# Patient Record
Sex: MALE | Race: WHITE | NOT HISPANIC OR LATINO | URBAN - METROPOLITAN AREA
[De-identification: names, ages, dates, MRNs, and addresses within clinical notes are randomized per-mention and may not be internally consistent; named-entity substitution may affect disease eponyms.]

---

## 2023-09-09 PROBLEM — E78.5 HYPERLIPIDEMIA: Status: ACTIVE | Noted: 2023-09-09

## 2023-09-09 PROBLEM — R73.01 IMPAIRED FASTING GLUCOSE: Status: ACTIVE | Noted: 2023-09-09

## 2023-09-09 PROBLEM — I10 ESSENTIAL HYPERTENSION: Status: ACTIVE | Noted: 2023-09-09

## 2023-09-09 PROBLEM — L40.9 PSORIASIS: Status: ACTIVE | Noted: 2023-09-09

## 2023-09-09 RX ORDER — CLOBETASOL PROPIONATE 0.5 MG/G
CREAM TOPICAL
COMMUNITY
Start: 2014-02-18

## 2023-09-09 RX ORDER — POTASSIUM CHLORIDE 1500 MG/1
TABLET, EXTENDED RELEASE ORAL
COMMUNITY
Start: 2022-10-18

## 2023-09-09 RX ORDER — NAPROXEN SODIUM 220 MG/1
TABLET, FILM COATED ORAL
COMMUNITY

## 2023-09-09 RX ORDER — HYDROCHLOROTHIAZIDE 25 MG/1
TABLET ORAL
COMMUNITY

## 2023-09-09 RX ORDER — MULTIVITAMIN
TABLET ORAL
COMMUNITY

## 2023-09-09 RX ORDER — ATORVASTATIN CALCIUM 20 MG/1
TABLET, FILM COATED ORAL
COMMUNITY
Start: 2023-03-13

## 2023-09-09 RX ORDER — LISINOPRIL 40 MG/1
TABLET ORAL
COMMUNITY

## 2023-10-01 ENCOUNTER — PHARMACY VISIT (OUTPATIENT)
Dept: PHARMACY | Facility: CLINIC | Age: 58
End: 2023-10-01
Payer: COMMERCIAL

## 2023-10-01 PROCEDURE — RXMED WILLOW AMBULATORY MEDICATION CHARGE

## 2024-01-03 ENCOUNTER — PHARMACY VISIT (OUTPATIENT)
Dept: PHARMACY | Facility: CLINIC | Age: 59
End: 2024-01-03
Payer: COMMERCIAL

## 2024-01-03 PROCEDURE — RXMED WILLOW AMBULATORY MEDICATION CHARGE

## 2024-03-04 DIAGNOSIS — I10 ESSENTIAL HYPERTENSION: Primary | ICD-10-CM

## 2024-03-05 RX ORDER — HYDROCHLOROTHIAZIDE 25 MG/1
25 TABLET ORAL DAILY
Qty: 90 TABLET | Refills: 3 | Status: SHIPPED | OUTPATIENT
Start: 2024-03-05 | End: 2025-03-05

## 2024-03-05 RX ORDER — POTASSIUM CHLORIDE 20 MEQ/1
20 TABLET, EXTENDED RELEASE ORAL DAILY
Qty: 90 TABLET | Refills: 3 | Status: SHIPPED | OUTPATIENT
Start: 2024-03-05 | End: 2025-03-05

## 2024-03-05 RX ORDER — LISINOPRIL 40 MG/1
40 TABLET ORAL DAILY
Qty: 90 TABLET | Refills: 3 | Status: SHIPPED | OUTPATIENT
Start: 2024-03-05 | End: 2025-03-05

## 2024-03-27 PROCEDURE — RXMED WILLOW AMBULATORY MEDICATION CHARGE

## 2024-03-28 ENCOUNTER — PHARMACY VISIT (OUTPATIENT)
Dept: PHARMACY | Facility: CLINIC | Age: 59
End: 2024-03-28
Payer: COMMERCIAL

## 2024-06-16 ENCOUNTER — APPOINTMENT (OUTPATIENT)
Dept: RADIOLOGY | Facility: HOSPITAL | Age: 59
End: 2024-06-16
Payer: COMMERCIAL

## 2024-06-16 ENCOUNTER — HOSPITAL ENCOUNTER (EMERGENCY)
Facility: HOSPITAL | Age: 59
Discharge: HOME | End: 2024-06-16
Attending: EMERGENCY MEDICINE
Payer: COMMERCIAL

## 2024-06-16 ENCOUNTER — APPOINTMENT (OUTPATIENT)
Dept: CARDIOLOGY | Facility: HOSPITAL | Age: 59
End: 2024-06-16
Payer: COMMERCIAL

## 2024-06-16 VITALS
HEIGHT: 72 IN | RESPIRATION RATE: 18 BRPM | BODY MASS INDEX: 32.25 KG/M2 | DIASTOLIC BLOOD PRESSURE: 67 MMHG | OXYGEN SATURATION: 95 % | SYSTOLIC BLOOD PRESSURE: 107 MMHG | TEMPERATURE: 98.6 F | WEIGHT: 238.1 LBS | HEART RATE: 65 BPM

## 2024-06-16 DIAGNOSIS — E87.1 HYPONATREMIA: ICD-10-CM

## 2024-06-16 DIAGNOSIS — B34.9 VIRAL ILLNESS: ICD-10-CM

## 2024-06-16 DIAGNOSIS — R53.83 FATIGUE, UNSPECIFIED TYPE: Primary | ICD-10-CM

## 2024-06-16 DIAGNOSIS — R31.9 HEMATURIA, UNSPECIFIED TYPE: ICD-10-CM

## 2024-06-16 LAB
ALBUMIN SERPL-MCNC: 3.8 G/DL (ref 3.5–5)
ALP BLD-CCNC: 122 U/L (ref 35–125)
ALT SERPL-CCNC: 57 U/L (ref 5–40)
AMPHETAMINES UR QL SCN>1000 NG/ML: NEGATIVE
ANION GAP SERPL CALC-SCNC: 12 MMOL/L
APAP SERPL-MCNC: <5 UG/ML
APPEARANCE UR: CLEAR
AST SERPL-CCNC: 35 U/L (ref 5–40)
BARBITURATES UR QL SCN>300 NG/ML: NEGATIVE
BASOPHILS # BLD AUTO: 0.03 X10*3/UL (ref 0–0.1)
BASOPHILS NFR BLD AUTO: 0.3 %
BENZODIAZ UR QL SCN>300 NG/ML: NEGATIVE
BILIRUB SERPL-MCNC: 0.8 MG/DL (ref 0.1–1.2)
BILIRUB UR STRIP.AUTO-MCNC: NEGATIVE MG/DL
BUN SERPL-MCNC: 27 MG/DL (ref 8–25)
BZE UR QL SCN>300 NG/ML: NEGATIVE
CALCIUM SERPL-MCNC: 9.1 MG/DL (ref 8.5–10.4)
CANNABINOIDS UR QL SCN>50 NG/ML: NEGATIVE
CHLORIDE SERPL-SCNC: 94 MMOL/L (ref 97–107)
CO2 SERPL-SCNC: 22 MMOL/L (ref 24–31)
COLOR UR: YELLOW
CREAT SERPL-MCNC: 0.9 MG/DL (ref 0.4–1.6)
EGFRCR SERPLBLD CKD-EPI 2021: >90 ML/MIN/1.73M*2
EOSINOPHIL # BLD AUTO: 0 X10*3/UL (ref 0–0.7)
EOSINOPHIL NFR BLD AUTO: 0 %
ERYTHROCYTE [DISTWIDTH] IN BLOOD BY AUTOMATED COUNT: 11.9 % (ref 11.5–14.5)
ETHANOL SERPL-MCNC: <0.01 G/DL
FENTANYL+NORFENTANYL UR QL SCN: NEGATIVE
FLUAV RNA RESP QL NAA+PROBE: NOT DETECTED
FLUBV RNA RESP QL NAA+PROBE: NOT DETECTED
GLUCOSE SERPL-MCNC: 152 MG/DL (ref 65–99)
GLUCOSE UR STRIP.AUTO-MCNC: NORMAL MG/DL
HCT VFR BLD AUTO: 39.1 % (ref 41–52)
HETEROPH AB SERPLBLD QL IA.RAPID: NEGATIVE
HGB BLD-MCNC: 13.5 G/DL (ref 13.5–17.5)
IMM GRANULOCYTES # BLD AUTO: 0.03 X10*3/UL (ref 0–0.7)
IMM GRANULOCYTES NFR BLD AUTO: 0.3 % (ref 0–0.9)
KETONES UR STRIP.AUTO-MCNC: NEGATIVE MG/DL
LEUKOCYTE ESTERASE UR QL STRIP.AUTO: NEGATIVE
LIPASE SERPL-CCNC: 29 U/L (ref 16–63)
LYMPHOCYTES # BLD AUTO: 1.52 X10*3/UL (ref 1.2–4.8)
LYMPHOCYTES NFR BLD AUTO: 16.6 %
MCH RBC QN AUTO: 29.9 PG (ref 26–34)
MCHC RBC AUTO-ENTMCNC: 34.5 G/DL (ref 32–36)
MCV RBC AUTO: 87 FL (ref 80–100)
METHADONE UR QL SCN>300 NG/ML: NEGATIVE
MONOCYTES # BLD AUTO: 0.65 X10*3/UL (ref 0.1–1)
MONOCYTES NFR BLD AUTO: 7.1 %
NEUTROPHILS # BLD AUTO: 6.9 X10*3/UL (ref 1.2–7.7)
NEUTROPHILS NFR BLD AUTO: 75.7 %
NITRITE UR QL STRIP.AUTO: NEGATIVE
NRBC BLD-RTO: 0 /100 WBCS (ref 0–0)
OPIATES UR QL SCN>300 NG/ML: NEGATIVE
OXYCODONE UR QL: NEGATIVE
PCP UR QL SCN>25 NG/ML: NEGATIVE
PH UR STRIP.AUTO: 6 [PH]
PLATELET # BLD AUTO: 191 X10*3/UL (ref 150–450)
POTASSIUM SERPL-SCNC: 3.9 MMOL/L (ref 3.4–5.1)
PROT SERPL-MCNC: 7.1 G/DL (ref 5.9–7.9)
PROT UR STRIP.AUTO-MCNC: ABNORMAL MG/DL
RBC # BLD AUTO: 4.52 X10*6/UL (ref 4.5–5.9)
RBC # UR STRIP.AUTO: ABNORMAL /UL
RBC #/AREA URNS AUTO: NORMAL /HPF
SALICYLATES SERPL-MCNC: <0 MG/DL
SARS-COV-2 RNA RESP QL NAA+PROBE: NOT DETECTED
SODIUM SERPL-SCNC: 128 MMOL/L (ref 133–145)
SP GR UR STRIP.AUTO: 1.03
TROPONIN T SERPL-MCNC: 17 NG/L
TROPONIN T SERPL-MCNC: 18 NG/L
TSH SERPL DL<=0.05 MIU/L-ACNC: 0.84 MIU/L (ref 0.27–4.2)
UROBILINOGEN UR STRIP.AUTO-MCNC: ABNORMAL MG/DL
WBC # BLD AUTO: 9.1 X10*3/UL (ref 4.4–11.3)
WBC #/AREA URNS AUTO: NORMAL /HPF

## 2024-06-16 PROCEDURE — 85025 COMPLETE CBC W/AUTO DIFF WBC: CPT | Performed by: CLINICAL NURSE SPECIALIST

## 2024-06-16 PROCEDURE — 36415 COLL VENOUS BLD VENIPUNCTURE: CPT | Performed by: CLINICAL NURSE SPECIALIST

## 2024-06-16 PROCEDURE — 2500000004 HC RX 250 GENERAL PHARMACY W/ HCPCS (ALT 636 FOR OP/ED): Performed by: CLINICAL NURSE SPECIALIST

## 2024-06-16 PROCEDURE — 84484 ASSAY OF TROPONIN QUANT: CPT | Performed by: CLINICAL NURSE SPECIALIST

## 2024-06-16 PROCEDURE — 71046 X-RAY EXAM CHEST 2 VIEWS: CPT | Performed by: RADIOLOGY

## 2024-06-16 PROCEDURE — 99283 EMERGENCY DEPT VISIT LOW MDM: CPT | Mod: 25

## 2024-06-16 PROCEDURE — 80053 COMPREHEN METABOLIC PANEL: CPT | Performed by: CLINICAL NURSE SPECIALIST

## 2024-06-16 PROCEDURE — 96361 HYDRATE IV INFUSION ADD-ON: CPT

## 2024-06-16 PROCEDURE — 87636 SARSCOV2 & INF A&B AMP PRB: CPT | Performed by: CLINICAL NURSE SPECIALIST

## 2024-06-16 PROCEDURE — 81003 URINALYSIS AUTO W/O SCOPE: CPT | Performed by: CLINICAL NURSE SPECIALIST

## 2024-06-16 PROCEDURE — 80143 DRUG ASSAY ACETAMINOPHEN: CPT | Performed by: EMERGENCY MEDICINE

## 2024-06-16 PROCEDURE — 84443 ASSAY THYROID STIM HORMONE: CPT | Performed by: CLINICAL NURSE SPECIALIST

## 2024-06-16 PROCEDURE — 96360 HYDRATION IV INFUSION INIT: CPT

## 2024-06-16 PROCEDURE — 80307 DRUG TEST PRSMV CHEM ANLYZR: CPT | Performed by: EMERGENCY MEDICINE

## 2024-06-16 PROCEDURE — 86308 HETEROPHILE ANTIBODY SCREEN: CPT | Performed by: EMERGENCY MEDICINE

## 2024-06-16 PROCEDURE — 71046 X-RAY EXAM CHEST 2 VIEWS: CPT

## 2024-06-16 PROCEDURE — 93005 ELECTROCARDIOGRAM TRACING: CPT

## 2024-06-16 PROCEDURE — 83690 ASSAY OF LIPASE: CPT | Performed by: CLINICAL NURSE SPECIALIST

## 2024-06-16 ASSESSMENT — COLUMBIA-SUICIDE SEVERITY RATING SCALE - C-SSRS
6. HAVE YOU EVER DONE ANYTHING, STARTED TO DO ANYTHING, OR PREPARED TO DO ANYTHING TO END YOUR LIFE?: NO
1. IN THE PAST MONTH, HAVE YOU WISHED YOU WERE DEAD OR WISHED YOU COULD GO TO SLEEP AND NOT WAKE UP?: NO
2. HAVE YOU ACTUALLY HAD ANY THOUGHTS OF KILLING YOURSELF?: NO

## 2024-06-16 ASSESSMENT — PAIN SCALES - GENERAL: PAINLEVEL_OUTOF10: 0 - NO PAIN

## 2024-06-16 ASSESSMENT — PAIN - FUNCTIONAL ASSESSMENT: PAIN_FUNCTIONAL_ASSESSMENT: 0-10

## 2024-06-16 ASSESSMENT — PAIN DESCRIPTION - PROGRESSION: CLINICAL_PROGRESSION: NOT CHANGED

## 2024-06-16 NOTE — ED PROVIDER NOTES
Department of Emergency Medicine   ED  Provider Note  Admit Date/RoomTime: 6/16/2024  2:24 PM  ED Room: ST28/ST        History of Present Illness:  Chief Complaint   Patient presents with    Flu Symptoms     Fever fatigue loss of appetite since thursday         Eric Sanchez is a 59 y.o. male history of hypertension presenting to the ED for generalized fatigue weakness fever max temp of 102 since Thursday.  Denies cough congestion runny nose sore throat no abdominal pain nausea vomiting or diarrhea no chest pain or shortness of breath no neck pain and no headache.  No one else is sick at home.  He denies pressure burning or frequency of urination.,  Presents today because he has extreme fatigue decreased appetite weakness and on and off fevers.    Review of Systems:   Pertinent positives and negatives are stated within HPI, all other systems reviewed and are negative.        --------------------------------------------- PAST HISTORY ---------------------------------------------  Past Medical History:  has no past medical history on file.  Past Surgical History:  has no past surgical history on file.  Social History:    Family History: family history includes Breast cancer in his mother; Diabetes (age of onset: 40 - 49) in his father; Heart disease (age of onset: 50 - 59) in his father.. Unless otherwise noted, family history is non contributory  The patient’s home medications have been reviewed.  Allergies: Cephalexin        ---------------------------------------------------PHYSICAL EXAM--------------------------------------    GENERAL APPEARANCE: Awake and alert.   VITAL SIGNS: As per the nurses' triage record.   HEENT: Normocephalic, atraumatic. Extraocular muscles are intact. Pupils equal round and reactive to light. Conjunctiva are pink. Negative scleral icterus. Mucous membranes are moist. Tongue in the midline. Pharynx was without erythema or exudates, uvula midline  NECK: Soft Nontender and supple,  full gross ROM, no meningeal signs.  CHEST: Nontender to palpation. Clear to auscultation bilaterally. No rales, rhonchi, or wheezing.   HEART: S1, S2. Regular rate and rhythm. No murmurs, gallops or rubs.  Strong and equal pulses in the extremities.   ABDOMEN: Soft, nontender, nondistended, positive bowel sounds, no palpable masses.  Back: No pain palpation of the cervical thoracic or lumbar spine no step-offs crepitus or bruising no CVA tenderness no saddle paresthesias    MUSCULCSKELETAL: The calves are nontender to palpation. Full gross active range of motion. Ambulating on own with no acute difficulties  NEUROLOGICAL: Awake, alert and oriented x 3. Power intact in the upper and lower extremities. Sensation is intact to light touch in the upper and lower extremities.   IMMUNOLOGICAL: No lymphatic streaking noted   DERM: No petechiae, rashes, or ecchymoses.          ------------------------- NURSING NOTES AND VITALS REVIEWED ---------------------------  The nursing notes within the ED encounter and vital signs as below have been reviewed by myself  /67 (BP Location: Right arm, Patient Position: Sitting)   Pulse 91   Temp 37 °C (98.6 °F) (Oral)   Resp 18   Ht 1.829 m (6')   Wt 108 kg (238 lb 1.6 oz)   SpO2 95%   BMI 32.29 kg/m²     Oxygen Saturation Interpretation: 95% room air      The patient’s available past medical records and past encounters were reviewed.          -----------------------DIAGNOSTIC RESULTS------------------------  LABS:    Labs Reviewed   CBC WITH AUTO DIFFERENTIAL - Abnormal       Result Value    WBC 9.1      nRBC 0.0      RBC 4.52      Hemoglobin 13.5      Hematocrit 39.1 (*)     MCV 87      MCH 29.9      MCHC 34.5      RDW 11.9      Platelets 191      Neutrophils % 75.7      Immature Granulocytes %, Automated 0.3      Lymphocytes % 16.6      Monocytes % 7.1      Eosinophils % 0.0      Basophils % 0.3      Neutrophils Absolute 6.90      Immature Granulocytes Absolute, Automated  0.03      Lymphocytes Absolute 1.52      Monocytes Absolute 0.65      Eosinophils Absolute 0.00      Basophils Absolute 0.03     COMPREHENSIVE METABOLIC PANEL - Abnormal    Glucose 152 (*)     Sodium 128 (*)     Potassium 3.9      Chloride 94 (*)     Bicarbonate 22 (*)     Urea Nitrogen 27 (*)     Creatinine 0.90      eGFR >90      Calcium 9.1      Albumin 3.8      Alkaline Phosphatase 122      Total Protein 7.1      AST 35      Bilirubin, Total 0.8      ALT 57 (*)     Anion Gap 12     URINALYSIS WITH REFLEX CULTURE AND MICROSCOPIC - Abnormal    Color, Urine Yellow      Appearance, Urine Clear      Specific Gravity, Urine 1.030      pH, Urine 6.0      Protein, Urine 20 (TRACE)      Glucose, Urine Normal      Blood, Urine 0.03 (TRACE) (*)     Ketones, Urine NEGATIVE      Bilirubin, Urine NEGATIVE      Urobilinogen, Urine 3 (1+) (*)     Nitrite, Urine NEGATIVE      Leukocyte Esterase, Urine NEGATIVE     SERIAL TROPONIN, INITIAL ([facility]) - Abnormal    Troponin T, High Sensitivity 18 (*)    SERIAL TROPONIN,  2 HOUR ([facility]) - Abnormal    Troponin T, High Sensitivity 17 (*)    TSH WITH REFLEX TO FREE T4 IF ABNORMAL - Normal    Thyroid Stimulating Hormone 0.84      Narrative:     TSH testing is performed using different testing methodology at [facility] than at other [facility]. Direct result comparisons should only be made within the same method.     SARS-COV-2 PCR - Normal    Coronavirus 2019, PCR Not Detected      Narrative:     This assay has received FDA Emergency Use Authorization (EUA) and is only authorized for the duration of time that circumstances exist to justify the authorization of the emergency use of in vitro diagnostic tests for the detection of SARS-CoV-2 virus and/or diagnosis of COVID-19 infection under section 564(b)(1) of the Act, 21 U.S.C. 360bbb-3(b)(1). This assay is an in vitro diagnostic nucleic acid amplification test for the qualitative detection of SARS-CoV-2 from  nasopharyngeal specimens and has been validated for use at [facility]. Negative results do not preclude COVID-19 infections and should not be used as the sole basis for diagnosis, treatment, or other management decisions.     INFLUENZA A AND B PCR - Normal    Flu A Result Not Detected      Flu B Result Not Detected      Narrative:     This assay is an in vitro diagnostic multiplex nucleic acid amplification test for the detection and discrimination of Influenza A & B from nasopharyngeal specimens, and has been validated for use at [facility]. Negative results do not preclude Influenza A/B infections, and should not be used as the sole basis for diagnosis, treatment, or other management decisions. If Influenza A/B and RSV PCR results are negative, testing for Parainfluenza virus, Adenovirus and Metapneumovirus is routinely performed for [facility] pediatric oncology and intensive care inpatients, and is available on other patients by placing an add-on request.   LIPASE - Normal    Lipase 29     DRUG SCREEN,URINE - Normal    Amphetamine Screen, Urine Negative      Barbiturate Screen, Urine Negative      Benzodiazepines Screen, Urine Negative      Cannabinoid Screen, Urine Negative      Cocaine Metabolite Screen, Urine Negative      Fentanyl Screen, Urine Negative      Methadone Screen, Urine Negative      Opiate Screen, Urine Negative      Oxycodone Screen, Urine Negative      PCP Screen, Urine Negative      Narrative:     These toxicological screening tests provide unconfirmed qualitative measurements to aid in treatment and diagnosis in cases of drug use or overdose. This test is used only for medical purposes. A positive result does not indicate or measure intoxication. For specific test performance or pathologist consultation, please contact the Laboratory.    The following threshold concentrations are used for these analyses.Values at or above the threshold concentration  are reported as positive. Values below the threshold are reported as negative.    Drug /Screening Threshold                                                                                                 THC/CANNABINOIDS................50 ng/ml  METHADONE......................300 ng/ml  COCAINE METABOLITES............300 ng/ml  BENZODIAZEPINE.................300 ng/ml  PCP.............................25 ng/ml  OPIATE.........................300 ng/ml  AMPHETAMINE/ECSTASY...........1000 ng/ml  BARBITURATE....................200 ng/ml  OXYCODONE......................100 ng/ml  FENTANYL.........................5 ng/ml       ACUTE TOXICOLOGY PANEL, BLOOD - Normal    Acetaminophen <5.0      Salicylate  <0      Alcohol <0.010     MONONUCLEOSIS SCREEN (HETEROPHILE ANTIBODY) - Normal    Mononucleosis Screen Negative     URINALYSIS MICROSCOPIC WITH REFLEX CULTURE - Normal    WBC, Urine NONE      RBC, Urine NONE     TROPONIN T SERIES, HIGH SENSITIVITY (0, 2 HR, 6 HR)    Narrative:     The following orders were created for panel order Troponin T Series, High Sensitivity (0, 2HR, 6HR).  Procedure                               Abnormality         Status                     ---------                               -----------         ------                     Serial Troponin, Initial...[[id number]]  Abnormal            Final result               Serial Troponin, 2 Hour ...[[id number]]  Abnormal            Final result               Serial Troponin, 6 Hour ...[[id number]]                                                   Please view results for these tests on the individual orders.   URINALYSIS WITH REFLEX CULTURE AND MICROSCOPIC    Narrative:     The following orders were created for panel order Urinalysis with Reflex Culture and Microscopic.  Procedure                               Abnormality         Status                     ---------                               -----------         ------                     Urinalysis with  Reflex C...[[id number]]  Abnormal            Final result               Extra Urine Gray Tube[[id number]]                                                         Please view results for these tests on the individual orders.   EXTRA URINE GRAY TUBE   SERIAL TROPONIN, 6 HOUR ([facility])       As interpreted by me, the above displayed labs are abnormal. All other labs obtained during this visit were within normal range or not returned as of this dictation.      EKG Interpretation    EKG with normal sinus rhythm at 89 bpm, incomplete right bundle branch block pattern, normal axis, normal ST segment, normal T waves         XR chest 2 views   Final Result   1.  No evidence of acute cardiopulmonary process.                  MACRO:   None        Signed by: Ja Chavez 6/16/2024 2:43 PM   Dictation workstation:   [id number]              XR chest 2 views   Final Result   1.  No evidence of acute cardiopulmonary process.                  MACRO:   None        Signed by: Ja Chavez 6/16/2024 2:43 PM   Dictation workstation:   [id number]              ------------------------------ ED COURSE/MEDICAL DECISION MAKING----------------------  Medical Decision Making:   Exam: A medically appropriate exam performed, outlined above, given the known history and presentation.    History obtained from: Review of medical record nursing notes patient      Social Determinants of Health considered during this visit: Takes care of himself at home no out of country travel no sick contacts      PAST MEDICAL HISTORY/Chronic Conditions Affecting Care     has no past medical history on file.       CC/HPI Summary, Social Determinants of health, Records Reviewed, DDx, testing done/not done, ED Course, Reassessment, disposition considerations/shared decision making with patient, consults, disposition:   Presents emergency department complaints of generalized fatigue decreased appetite fever  Plan  Chest x-ray 1. No evidence of acute cardiopulmonary  process.   EKG  CBC  CMP  COVID  Flu  Lipase  Troponin  TSH  Urine  Medical Decision Making/Differential Diagnosis:  Differentials include but not limited to viral illness versus electrolyte abnormality versus dehydration versus thyroid issue versus pneumonia versus acute coronary syndrome  Patient presented with generalized fatigue.  History of fever.  And decreased appetite    Patient did show some mild hyponatremia chloride also slightly low.  With electrolyte imbalance noted.  BUN mildly elevated.  Did receive IV fluids he reports improvement of symptoms after IV fluids may be some component of dehydration.  Will have him follow-up with primary care physician for reevaluation and repeat laboratory data.  Review drug screen negative.  Tox screen negative.  Patient reports sometimes he does drink alcohol on a regular basis but has not been drinking alcohol lately.  Urine is not consistent with UTI did show trace blood no signs of UTI.  Does not present like a kidney stone or pyelonephritis.  Advised to follow-up with primary care physician for resolution of blood.  No protein noted in the urine.  COVID flu negative.  Chest x-ray showed no acute cardiopulmonary process.  Lipase normal.  Denies any fall or injury.  No body aches or muscle aches.  No elevation white blood cell count patient is not anemic mono negative thyroid level within normal limits based on patient's clinical presentation history and symptoms consistent with viral illness with reported fever.  No meningeal signs he is well-nourished well-hydrated nontoxic-appearing no acute distress.  EKG per attending note no ST elevation or arrhythmia troponin 18 and 17 with no complaints of chest pain.  Advised on supportive cares measures at home close follow-up return with any worsening symptoms or concerns   Impression  Viral illness  Fatigue  Hematuria  Mild hyponatremia  Patient seen evaluated with attending physician Dr. Stern   PROCEDURES  Unless  otherwise noted below, none      CONSULTS:   None      ED Course as of 06/16/24 1923   Sun Jun 16, 2024 1901 On reevaluation patient reports he is feeling better.  Reviewed laboratory data and test results with him.  Advised him to follow-up with primary care physician.  Return with any worsening symptoms or concerns [TB]      ED Course User Index  [TB] EUGENE Michel-CNP         Diagnoses as of 06/16/24 1923   Fatigue, unspecified type   Viral illness   Hematuria, unspecified type   Hyponatremia         This patient has remained hemodynamically stable during their ED course.      Critical Care: None      Counseling:  The emergency provider has spoken with the patient and discussed today’s results, in addition to providing specific details for the plan of care and counseling regarding the diagnosis and prognosis.  Questions are answered at this time and they are agreeable with the plan.         --------------------------------- IMPRESSION AND DISPOSITION ---------------------------------    IMPRESSION  1. Fatigue, unspecified type    2. Viral illness    3. Hematuria, unspecified type    4. Hyponatremia        DISPOSITION  Disposition: Discharge home  Patient condition is stable improved        NOTE: This report was transcribed using voice recognition software. Every effort was made to ensure accuracy; however, inadvertent computerized transcription errors may be present      VERA Michel  06/16/24 1924       VERA Michel  06/16/24 1931

## 2024-06-16 NOTE — DISCHARGE INSTRUCTIONS
Increase fluids  Monitor for worsening signs and symptoms of infection or respiratory distress  Follow-up with your primary care physician in 2 days for reevaluation  Return with any worsening symptoms or concerns Tylenol Motrin for pain or fever  Follow-up with your primary care physician for reevaluation of sodium level and resolution of blood in the urine.

## 2024-06-16 NOTE — PROGRESS NOTES
Attestation/Supervisory note for FOX Mateo      The patient is a 59-year-old male presenting to the emergency department for evaluation of generalized malaise and fatigue, subjective fever, and decreased appetite.  He states he has had symptoms for the past 4 to 5 days.  He states his wife was ill but did not have the exact same symptoms.  He denies any headache or visual changes.  He denies any focal weakness or numbness.  He denies any chest pain or shortness of breath.  No abdominal pain.  No nausea or vomiting.  No diarrhea or constipation.  No urinary complaints.  He denies any travel.  No cough or congestion.  No sore throat.  He states he just feels like he does not have any energy.  All pertinent positives and negatives are recorded above.  All other systems reviewed and otherwise negative.  Vital signs within normal limits.  Physical exam with a well-nourished well-developed male in no acute distress.  HEENT exam with dry mucous membranes but otherwise unremarkable.  He has no evidence of airway compromise or respiratory distress.  Abdominal exam is benign.  He has no gross motor, neurologic or vascular deficits on exam.      EKG with normal sinus rhythm at 89 bpm, incomplete right bundle branch block pattern, normal axis, normal ST segment, normal T waves      Diagnostic labs with borderline troponin T results, urobilinogen, electrolyte imbalance with mild hyponatremia, mildly elevated ALT but otherwise unremarkable.  Monospot and TSH were normal as well.      Initial Troponin T 18.  Repeat troponin T 17      XR chest 2 views   Final Result   1.  No evidence of acute cardiopulmonary process.                  MACRO:   None        Signed by: Ja Chavez 6/16/2024 2:43 PM   Dictation workstation:   [id number]           Patient does not have any evidence of ischemia on EKG.  No events on telemetry.  The cardiac enzymes are borderline but flat.  He does not have any evidence of airway compromise or  respiratory distress on exam.  He does not have any acute process on chest x-ray.  No evidence of pneumonia or pneumothorax.  No evidence of CHF.  No widening of the mediastinum.  He does have equal pulses bilaterally.  He does not have any gross motor, neurologic or vascular deficits on exam.  He does have some subtle lab abnormalities but no significant abnormalities.  He does not have any evidence of hemodynamic instability.   Unclear etiology of the patient's symptoms.  It is certainly possible that the patient does have a viral syndrome but we will recommend close outpatient follow-up with his primary care physician for further management of his symptoms.      The patient was released in good condition.  He was instructed to follow-up with his primary care physician within 1 to 2 days for further management of his current symptoms.  He will return to the emergency department sooner with worsening of symptoms or onset of new symptoms.  He will return to the emergency department sooner with worsening of symptoms or onset of new symptoms      Impression/diagnosis:  Malaise and fatigue  Electrolyte imbalance      I personally saw the patient and made/approve the management plan and take responsibility for the patient management.      I independently interpreted the following study (S) EKG and diagnostic labs      I personally discussed the patient's management with the      I reviewed the results of the diagnostic labs and diagnostic imaging.  Formal radiology read was completed by the radiologist.      Aditi Stern MD

## 2024-06-16 NOTE — Clinical Note
Eric Sanchez was seen and treated in our emergency department on 6/16/2024.  He may return to work on 06/19/2024.  1-3 days should be 48 hours without a fever      If you have any questions or concerns, please don't hesitate to call.      Aditi Stern MD

## 2024-06-20 ENCOUNTER — OFFICE VISIT (OUTPATIENT)
Dept: GASTROENTEROLOGY | Facility: CLINIC | Age: 59
End: 2024-06-20
Payer: COMMERCIAL

## 2024-06-20 ENCOUNTER — TELEPHONE (OUTPATIENT)
Dept: PRIMARY CARE | Facility: CLINIC | Age: 59
End: 2024-06-20
Payer: COMMERCIAL

## 2024-06-20 ENCOUNTER — LAB (OUTPATIENT)
Dept: LAB | Facility: LAB | Age: 59
End: 2024-06-20
Payer: COMMERCIAL

## 2024-06-20 VITALS
HEART RATE: 87 BPM | WEIGHT: 234 LBS | BODY MASS INDEX: 31.69 KG/M2 | DIASTOLIC BLOOD PRESSURE: 65 MMHG | HEIGHT: 72 IN | SYSTOLIC BLOOD PRESSURE: 103 MMHG | TEMPERATURE: 96 F

## 2024-06-20 DIAGNOSIS — R50.9 FEVER, UNSPECIFIED FEVER CAUSE: Primary | ICD-10-CM

## 2024-06-20 DIAGNOSIS — R19.7 DIARRHEA, UNSPECIFIED TYPE: ICD-10-CM

## 2024-06-20 DIAGNOSIS — R53.83 OTHER FATIGUE: ICD-10-CM

## 2024-06-20 DIAGNOSIS — Z12.11 SCREENING FOR COLON CANCER: ICD-10-CM

## 2024-06-20 DIAGNOSIS — K76.0 HEPATIC STEATOSIS: ICD-10-CM

## 2024-06-20 DIAGNOSIS — E87.1 HYPONATREMIA: ICD-10-CM

## 2024-06-20 LAB
GLIADIN PEPTIDE IGA SER IA-ACNC: 1.2 U/ML
TTG IGA SER IA-ACNC: <1 U/ML

## 2024-06-20 PROCEDURE — 82784 ASSAY IGA/IGD/IGG/IGM EACH: CPT

## 2024-06-20 PROCEDURE — 3074F SYST BP LT 130 MM HG: CPT | Performed by: NURSE PRACTITIONER

## 2024-06-20 PROCEDURE — RXMED WILLOW AMBULATORY MEDICATION CHARGE

## 2024-06-20 PROCEDURE — 36415 COLL VENOUS BLD VENIPUNCTURE: CPT

## 2024-06-20 PROCEDURE — 83516 IMMUNOASSAY NONANTIBODY: CPT

## 2024-06-20 PROCEDURE — 80053 COMPREHEN METABOLIC PANEL: CPT

## 2024-06-20 PROCEDURE — 99214 OFFICE O/P EST MOD 30 MIN: CPT | Performed by: NURSE PRACTITIONER

## 2024-06-20 PROCEDURE — 99204 OFFICE O/P NEW MOD 45 MIN: CPT | Performed by: NURSE PRACTITIONER

## 2024-06-20 PROCEDURE — 3078F DIAST BP <80 MM HG: CPT | Performed by: NURSE PRACTITIONER

## 2024-06-20 RX ORDER — SODIUM, POTASSIUM,MAG SULFATES 17.5-3.13G
SOLUTION, RECONSTITUTED, ORAL ORAL
Qty: 354 ML | Refills: 0 | Status: SHIPPED | OUTPATIENT
Start: 2024-06-20

## 2024-06-20 ASSESSMENT — ENCOUNTER SYMPTOMS
FATIGUE: 1
PSYCHIATRIC NEGATIVE: 1
ALLERGIC/IMMUNOLOGIC NEGATIVE: 1
RESPIRATORY NEGATIVE: 1
HEMATOLOGIC/LYMPHATIC NEGATIVE: 1
CARDIOVASCULAR NEGATIVE: 1
EYES NEGATIVE: 1
ENDOCRINE NEGATIVE: 1
ABDOMINAL PAIN: 1
MUSCULOSKELETAL NEGATIVE: 1
NEUROLOGICAL NEGATIVE: 1

## 2024-06-20 ASSESSMENT — PAIN SCALES - GENERAL: PAINLEVEL: 2

## 2024-06-20 NOTE — PATIENT INSTRUCTIONS
Fever, generalized weakness and fever- I would recommend labs for c-diff, culture, ova and parasite and blood work for celiac and RSV.     Hepatic steatosis- I will order a US of the liver, your current symptoms are not consistent with this     Hyponatremia- I will repeat labs to check this.    Please follow up with your PCP as well

## 2024-06-20 NOTE — PROGRESS NOTES
Subjective   Patient ID: Eric Sanchez is a 59 y.o. male who presents for New Patient Visit (New patient).  HPI  59-year-old male for new patient visit for abdominal pain and fatigue  Seen in the emergency room 6/16/2024 for generalized fatigue and running a fever up to 102 x 1 week  SHX: cholecystectomy 2008- for stones  Medical history includes hypertension, fatty liver  Labs reviewed toxicology panel negative, drug screen negative  Normal urinalysis  Normal lipase  Mildly elevated troponins  Negative for flu or COVID  TSH 0.84  Testicular was unremarkable  Sodium 128  Normal LFTs except for ALT 57  H&H 13.5 and 39.1  3/14/2019 colonoscopy with Dr. 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   HENT: Negative.     Eyes: Negative.    Respiratory: Negative.     Cardiovascular: Negative.    Gastrointestinal:  Positive for abdominal pain.   Endocrine: Negative.    Genitourinary: Negative.    Musculoskeletal: Negative.    Skin: Negative.    Allergic/Immunologic: Negative.    Neurological: Negative.    Hematological: Negative.    Psychiatric/Behavioral: Negative.         Objective   Physical Exam  Constitutional:       Appearance: Normal appearance.   HENT:      Head: Normocephalic.      Nose: Nose normal.      Mouth/Throat:      Mouth: Mucous membranes are moist.   Eyes:      Pupils: Pupils are equal, round, and reactive to light.   Cardiovascular:      Rate and Rhythm: Normal rate and regular rhythm.      Pulses: Normal pulses.      Heart sounds:  Normal heart sounds.   Pulmonary:      Effort: Pulmonary effort is normal.      Breath sounds: Normal breath sounds.   Abdominal:      General: Bowel sounds are normal.      Palpations: Abdomen is soft.   Musculoskeletal:         General: Normal range of motion.      Cervical back: Normal range of motion and neck supple.   Skin:     General: Skin is warm and dry.   Neurological:      General: No focal deficit present.      Mental Status: He is alert.   Psychiatric:         Mood and Affect: Mood normal.     Rash on truck and back, red- flat, circular    Assessment/Plan       Fever, generalized weakness and fever- I would recommend labs for c-diff, culture, ova and parasite and blood work for celiac and RSV.     Hepatic steatosis- I will order a US of the liver, your current symptoms are not consistent with this     Hyponatremia- I will repeat labs to check this.    Please follow up with your PCP as well    EUGENE Markham-CNP 06/20/24 2:15 PM

## 2024-06-20 NOTE — TELEPHONE ENCOUNTER
Edna states pt has had a fever of 102 + since 6/13.  Pt went to ER on 6/16.  Pt was negative for covid and flu.  Pt  has 102.6 fever, loss of appetite, lethargic, doesn't want to do anything, stiffness in neck, and isn't really drinking since 6/13. I told Edna to take pt to hospital to have him evaluated again.

## 2024-06-20 NOTE — TELEPHONE ENCOUNTER
I called pt back after I saw he was not in the ED.  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  Pt did state that he will go to ED today if needed after seeing the dr there.

## 2024-06-21 DIAGNOSIS — E87.1 HYPONATREMIA: Primary | ICD-10-CM

## 2024-06-21 LAB
ALBUMIN SERPL BCP-MCNC: 3.5 G/DL (ref 3.4–5)
ALP SERPL-CCNC: 87 U/L (ref 33–120)
ALT SERPL W P-5'-P-CCNC: 56 U/L (ref 10–52)
ANION GAP SERPL CALC-SCNC: 19 MMOL/L (ref 10–20)
AST SERPL W P-5'-P-CCNC: 25 U/L (ref 9–39)
BILIRUB SERPL-MCNC: 0.7 MG/DL (ref 0–1.2)
BUN SERPL-MCNC: 17 MG/DL (ref 6–23)
CALCIUM SERPL-MCNC: 9.2 MG/DL (ref 8.6–10.6)
CHLORIDE SERPL-SCNC: 101 MMOL/L (ref 98–107)
CO2 SERPL-SCNC: 22 MMOL/L (ref 21–32)
CREAT SERPL-MCNC: 0.72 MG/DL (ref 0.5–1.3)
EGFRCR SERPLBLD CKD-EPI 2021: >90 ML/MIN/1.73M*2
GLUCOSE SERPL-MCNC: 117 MG/DL (ref 74–99)
POTASSIUM SERPL-SCNC: 4.6 MMOL/L (ref 3.5–5.3)
PROT SERPL-MCNC: 6.3 G/DL (ref 6.4–8.2)
SODIUM SERPL-SCNC: 137 MMOL/L (ref 136–145)

## 2024-06-22 LAB
GLIADIN PEPTIDE IGG SER IA-ACNC: 8.03 FLU (ref 0–4.99)
TTG IGG SER IA-ACNC: <0.82 FLU (ref 0–4.99)

## 2024-06-24 ENCOUNTER — APPOINTMENT (OUTPATIENT)
Dept: RADIOLOGY | Facility: HOSPITAL | Age: 59
End: 2024-06-24
Payer: COMMERCIAL

## 2024-06-24 DIAGNOSIS — K76.0 HEPATIC STEATOSIS: ICD-10-CM

## 2024-06-24 DIAGNOSIS — R53.83 OTHER FATIGUE: Primary | ICD-10-CM

## 2024-06-24 LAB — IGG SERPL-MCNC: 860 MG/DL (ref 700–1600)

## 2024-06-24 PROCEDURE — 76705 ECHO EXAM OF ABDOMEN: CPT

## 2024-06-24 PROCEDURE — 76705 ECHO EXAM OF ABDOMEN: CPT | Performed by: RADIOLOGY

## 2024-06-25 DIAGNOSIS — R89.4 ABNORMAL CELIAC ANTIBODY PANEL: Primary | ICD-10-CM

## 2024-06-25 DIAGNOSIS — K76.0 HEPATIC STEATOSIS: Primary | ICD-10-CM

## 2024-06-25 PROCEDURE — RXMED WILLOW AMBULATORY MEDICATION CHARGE

## 2024-06-27 ENCOUNTER — PHARMACY VISIT (OUTPATIENT)
Dept: PHARMACY | Facility: CLINIC | Age: 59
End: 2024-06-27
Payer: COMMERCIAL

## 2024-06-27 ENCOUNTER — OFFICE VISIT (OUTPATIENT)
Dept: PRIMARY CARE | Facility: CLINIC | Age: 59
End: 2024-06-27
Payer: COMMERCIAL

## 2024-06-27 ENCOUNTER — LAB (OUTPATIENT)
Dept: LAB | Facility: LAB | Age: 59
End: 2024-06-27
Payer: COMMERCIAL

## 2024-06-27 VITALS
BODY MASS INDEX: 31.97 KG/M2 | WEIGHT: 236 LBS | OXYGEN SATURATION: 95 % | HEIGHT: 72 IN | TEMPERATURE: 97.7 F | HEART RATE: 85 BPM | SYSTOLIC BLOOD PRESSURE: 122 MMHG | DIASTOLIC BLOOD PRESSURE: 70 MMHG

## 2024-06-27 DIAGNOSIS — Z01.89 ENCOUNTER FOR ROUTINE LABORATORY TESTING: Primary | ICD-10-CM

## 2024-06-27 DIAGNOSIS — R21 ERYTHEMATOUS RASH: ICD-10-CM

## 2024-06-27 LAB
ALBUMIN SERPL-MCNC: 3.8 G/DL (ref 3.5–5)
ALP BLD-CCNC: 108 U/L (ref 35–125)
ALT SERPL-CCNC: 44 U/L (ref 5–40)
ANION GAP SERPL CALC-SCNC: 10 MMOL/L
AST SERPL-CCNC: 21 U/L (ref 5–40)
BASOPHILS # BLD AUTO: 0.08 X10*3/UL (ref 0–0.1)
BASOPHILS NFR BLD AUTO: 0.7 %
BILIRUB SERPL-MCNC: 0.4 MG/DL (ref 0.1–1.2)
BUN SERPL-MCNC: 16 MG/DL (ref 8–25)
CALCIUM SERPL-MCNC: 9.5 MG/DL (ref 8.5–10.4)
CHLORIDE SERPL-SCNC: 99 MMOL/L (ref 97–107)
CO2 SERPL-SCNC: 26 MMOL/L (ref 24–31)
CREAT SERPL-MCNC: 0.8 MG/DL (ref 0.4–1.6)
EGFRCR SERPLBLD CKD-EPI 2021: >90 ML/MIN/1.73M*2
EOSINOPHIL # BLD AUTO: 0 X10*3/UL (ref 0–0.7)
EOSINOPHIL NFR BLD AUTO: 0 %
ERYTHROCYTE [DISTWIDTH] IN BLOOD BY AUTOMATED COUNT: 12.8 % (ref 11.5–14.5)
GLUCOSE SERPL-MCNC: 117 MG/DL (ref 65–99)
HCT VFR BLD AUTO: 38.7 % (ref 41–52)
HGB BLD-MCNC: 12.6 G/DL (ref 13.5–17.5)
IMM GRANULOCYTES # BLD AUTO: 0.5 X10*3/UL (ref 0–0.7)
IMM GRANULOCYTES NFR BLD AUTO: 4.6 % (ref 0–0.9)
LYMPHOCYTES # BLD AUTO: 3.18 X10*3/UL (ref 1.2–4.8)
LYMPHOCYTES NFR BLD AUTO: 29.1 %
MCH RBC QN AUTO: 30.1 PG (ref 26–34)
MCHC RBC AUTO-ENTMCNC: 32.6 G/DL (ref 32–36)
MCV RBC AUTO: 92 FL (ref 80–100)
MONOCYTES # BLD AUTO: 0.5 X10*3/UL (ref 0.1–1)
MONOCYTES NFR BLD AUTO: 4.6 %
NEUTROPHILS # BLD AUTO: 6.68 X10*3/UL (ref 1.2–7.7)
NEUTROPHILS NFR BLD AUTO: 61 %
NRBC BLD-RTO: 0 /100 WBCS (ref 0–0)
PLATELET # BLD AUTO: 244 X10*3/UL (ref 150–450)
POTASSIUM SERPL-SCNC: 5.1 MMOL/L (ref 3.4–5.1)
PROT SERPL-MCNC: 6.9 G/DL (ref 5.9–7.9)
RBC # BLD AUTO: 4.19 X10*6/UL (ref 4.5–5.9)
SODIUM SERPL-SCNC: 135 MMOL/L (ref 133–145)
WBC # BLD AUTO: 10.9 X10*3/UL (ref 4.4–11.3)

## 2024-06-27 PROCEDURE — 3074F SYST BP LT 130 MM HG: CPT | Performed by: LICENSED PRACTICAL NURSE

## 2024-06-27 PROCEDURE — 87476 LYME DIS DNA AMP PROBE: CPT

## 2024-06-27 PROCEDURE — RXMED WILLOW AMBULATORY MEDICATION CHARGE

## 2024-06-27 PROCEDURE — 85025 COMPLETE CBC W/AUTO DIFF WBC: CPT

## 2024-06-27 PROCEDURE — 80053 COMPREHEN METABOLIC PANEL: CPT

## 2024-06-27 PROCEDURE — 36415 COLL VENOUS BLD VENIPUNCTURE: CPT

## 2024-06-27 PROCEDURE — 3078F DIAST BP <80 MM HG: CPT | Performed by: LICENSED PRACTICAL NURSE

## 2024-06-27 PROCEDURE — 99214 OFFICE O/P EST MOD 30 MIN: CPT | Performed by: LICENSED PRACTICAL NURSE

## 2024-06-27 RX ORDER — TRIAMCINOLONE ACETONIDE 1 MG/G
CREAM TOPICAL
Qty: 454 G | Refills: 2 | OUTPATIENT
Start: 2024-06-27

## 2024-06-27 RX ORDER — DOXYCYCLINE 100 MG/1
TABLET ORAL
Qty: 42 TABLET | Refills: 0 | OUTPATIENT
Start: 2024-06-27

## 2024-06-27 ASSESSMENT — PATIENT HEALTH QUESTIONNAIRE - PHQ9
1. LITTLE INTEREST OR PLEASURE IN DOING THINGS: NOT AT ALL
1. LITTLE INTEREST OR PLEASURE IN DOING THINGS: NOT AT ALL
SUM OF ALL RESPONSES TO PHQ9 QUESTIONS 1 AND 2: 0
2. FEELING DOWN, DEPRESSED OR HOPELESS: NOT AT ALL
SUM OF ALL RESPONSES TO PHQ9 QUESTIONS 1 AND 2: 0
2. FEELING DOWN, DEPRESSED OR HOPELESS: NOT AT ALL

## 2024-06-27 ASSESSMENT — PAIN SCALES - GENERAL: PAINLEVEL: 0-NO PAIN

## 2024-06-27 NOTE — PROGRESS NOTES
[facility]: [facility]  PROGRESS NOTE      Eric Sanchez is a 59 y.o. male that is presenting today for Hospital Follow-up.      Subjective   Pt is a 59yr old male who presents to the office for follow up on his ED visit and for new onset rash. Mr. Sanchez has a PMH of HTN and HLD. He reports 2 weeks ago feeling extreme fatigue and fever. Mr. Sanchez presented to the ED on 6/16/24 with concerns of generalized fatigue, weakness, and  fever max temp of 102. His labs were generally benign. A thorough work up was completed without any significant findings and he was sent home. Mr. Sanchez followed up shortly with GI outpatient related a slightly elevated ALT. U/S to the ABD showed fatty liver only. 3 days after his ED visit Mr. Tafoya  developed a rash to his right abdomen that increased in size over the next day. He notice over the course of the next 3 days several large circular rashes developing over his body.    Today he reports being seen by Dermatology who started him on doxycyline 100mg BID x 21 days for Erythema Migrans. Pt reports his fatigue and fever has generally resolved however the rash persists.       Review of Systems   Skin:  Positive for rash.      Objective   Vitals:    06/27/24 1355   BP: 122/70   Pulse: 85   Temp: 36.5 °C (97.7 °F)   SpO2: 95%      Body mass index is 32 kg/m².  Physical Exam  Vitals reviewed.   Constitutional:       General: He is not in acute distress.     Appearance: Normal appearance. He is not ill-appearing, toxic-appearing or diaphoretic.   Cardiovascular:      Rate and Rhythm: Normal rate and regular rhythm.   Pulmonary:      Effort: Pulmonary effort is normal. No respiratory distress.      Breath sounds: Normal breath sounds. No wheezing or rales.   Skin:     General: Skin is warm and dry.             Comments: Abdominal rash large, diffuse, round and erythemic, multiple over body  Distinct smaller bull's eye rash to the right upper thigh       "      Diagnostic Results   Lab Results   Component Value Date    GLUCOSE 117 (H) 06/20/2024    CALCIUM 9.2 06/20/2024     06/20/2024    K 4.6 06/20/2024    CO2 22 06/20/2024     06/20/2024    BUN 17 06/20/2024    CREATININE 0.72 06/20/2024     Lab Results   Component Value Date    ALT 56 (H) 06/20/2024    AST 25 06/20/2024    ALKPHOS 87 06/20/2024    BILITOT 0.7 06/20/2024     Lab Results   Component Value Date    WBC 9.1 06/16/2024    HGB 13.5 06/16/2024    HCT 39.1 (L) 06/16/2024    MCV 87 06/16/2024     06/16/2024     Lab Results   Component Value Date    CHOL 279 (H) 03/10/2023    CHOL 296 (H) 03/03/2020     Lab Results   Component Value Date    HDL 51 03/10/2023    HDL 42 03/03/2020     Lab Results   Component Value Date    LDLCALC 188 (H) 03/10/2023    LDLCALC 216 (H) 03/03/2020     Lab Results   Component Value Date    TRIG 202 (H) 03/10/2023    TRIG 192 (H) 03/03/2020     No components found for: \"CHOLHDL\"  Lab Results   Component Value Date    HGBA1C 6.1 (H) 03/10/2023     Other labs not included in the list above were reviewed either before or during this encounter.    History    History reviewed. No pertinent past medical history.  History reviewed. No pertinent surgical history.  Family History   Problem Relation Name Age of Onset    Breast cancer Mother      Diabetes Father  40 - 49        late 40s    Heart disease Father  50 - 59        late 50s     Social History     Socioeconomic History    Marital status:      Spouse name: Not on file    Number of children: Not on file    Years of education: Not on file    Highest education level: Not on file   Occupational History    Not on file   Tobacco Use    Smoking status: Unknown    Smokeless tobacco: Not on file   Vaping Use    Vaping status: Never Used   Substance and Sexual Activity    Alcohol use: Not on file    Drug use: Never    Sexual activity: Not on file   Other Topics Concern    Not on file   Social History Narrative    Not " on file     Social Determinants of Health     Financial Resource Strain: Not on file   Food Insecurity: Not on file   Transportation Needs: Not on file   Physical Activity: Not on file   Stress: Not on file   Social Connections: Not on file   Intimate Partner Violence: Not on file   Housing Stability: Not on file     Allergies   Allergen Reactions    Cephalexin Hives     Current Outpatient Medications on File Prior to Visit   Medication Sig Dispense Refill    aspirin 81 mg chewable tablet 1 tablet Orally Once a day      atorvastatin (Lipitor) 20 mg tablet 1 tablet Orally Once a day for 30 day(s)      cholecalciferol, vitamin D3, (VITAMIN D3 ORAL) 2000 UNIT-  as directed Orally Daily with food      clobetasol (Temovate) 0.05 % cream 1 application to affected area Externally Twice a day      docosahexaenoic acid/epa (FISH OIL ORAL) 1000 MG- 3 tablets Orally daily      hydroCHLOROthiazide (HYDRODiuril) 25 mg tablet TAKE 1 TABLET BY MOUTH ONE TIME DAILY 90 tablet 3    Klor-Con M20 20 mEq ER tablet       lisinopril 40 mg tablet TAKE 1 TABLET BY MOUTH ONE TIME DAILY 90 tablet 3    multivitamin tablet 1 tablet Orally daily      potassium chloride CR (Klor-Con M20) 20 mEq ER tablet TAKE 1 TABLET BY MOUTH ONCE A DAY 90 tablet 3    sodium,potassium,mag sulfates (Suprep Bowel Prep Kit) 17.5-3.13-1.6 gram recon soln solution Drink one bottle by mouth twice as directed by the prep instructions 354 mL 0    [DISCONTINUED] lisinopril 40 mg tablet TAKE 1 TABLET BY MOUTH ONE TIME DAILY. for 30       No current facility-administered medications on file prior to visit.     Immunization History   Administered Date(s) Administered    DTaP vaccine, pediatric  (INFANRIX) 10/10/2013    Influenza, injectable, quadrivalent 10/09/2015    Moderna SARS-CoV-2 Vaccination 12/29/2020, 01/27/2021, 11/10/2021    Pfizer COVID-19 vaccine, Fall 2023, 12 years and older, (30mcg/0.3mL) 10/06/2023    Pfizer COVID-19 vaccine, bivalent, age 12 years and  older (30 mcg/0.3 mL) 11/01/2022    Tdap vaccine, age 7 year and older (BOOSTRIX, ADACEL) 03/13/2023     Patient's medical history was reviewed and updated either before or during this encounter.       Assessment/Plan   Problem List Items Addressed This Visit       Erythematous rash    Relevant Orders    Lyme disease, PCR    CBC and Auto Differential    Comprehensive metabolic panel    Referral to Infectious Disease    Encounter for routine laboratory testing - Primary   I agree with likely Erythema Migranes diagnosis based on Pts symptoms and distinct rash. He will continue doxycyline 100mg BID, I shared with him 14 day course is recommended vs 21 day. I will also order CBC, CMP, Lyme test as the rash appeared after his initial blood work. I also will refer him to infectious disease.     Pts PCP, Dr. Rodríguez,  notified of assessment and treatment plan.    EUGENE Maki-CNP

## 2024-06-29 LAB
B BURGDOR DNA SPEC QL NAA+PROBE: NOT DETECTED
SPECIMEN SOURCE: NORMAL

## 2024-07-03 ENCOUNTER — OFFICE VISIT (OUTPATIENT)
Dept: PRIMARY CARE | Facility: CLINIC | Age: 59
End: 2024-07-03
Payer: COMMERCIAL

## 2024-07-03 VITALS
SYSTOLIC BLOOD PRESSURE: 110 MMHG | TEMPERATURE: 97.5 F | HEIGHT: 72 IN | HEART RATE: 88 BPM | WEIGHT: 237 LBS | BODY MASS INDEX: 32.1 KG/M2 | DIASTOLIC BLOOD PRESSURE: 72 MMHG | OXYGEN SATURATION: 96 %

## 2024-07-03 DIAGNOSIS — A69.20 ERYTHEMA MIGRANS (LYME DISEASE): Primary | ICD-10-CM

## 2024-07-03 DIAGNOSIS — R21 ERYTHEMATOUS RASH: Primary | ICD-10-CM

## 2024-07-03 PROBLEM — S40.862A INSECT BITE (NONVENOMOUS) OF LEFT UPPER ARM, INITIAL ENCOUNTER: Status: RESOLVED | Noted: 2024-07-03 | Resolved: 2024-07-03
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
## 2024-07-03 PROCEDURE — 99213 OFFICE O/P EST LOW 20 MIN: CPT | Performed by: LICENSED PRACTICAL NURSE

## 2024-07-03 PROCEDURE — 3074F SYST BP LT 130 MM HG: CPT | Performed by: LICENSED PRACTICAL NURSE

## 2024-07-03 PROCEDURE — 1036F TOBACCO NON-USER: CPT | Performed by: LICENSED PRACTICAL NURSE

## 2024-07-03 PROCEDURE — 3078F DIAST BP <80 MM HG: CPT | Performed by: LICENSED PRACTICAL NURSE

## 2024-07-03 ASSESSMENT — ENCOUNTER SYMPTOMS
LOSS OF SENSATION IN FEET: 0
OCCASIONAL FEELINGS OF UNSTEADINESS: 0
DEPRESSION: 0

## 2024-07-03 ASSESSMENT — LIFESTYLE VARIABLES
HOW OFTEN DURING THE LAST YEAR HAVE YOU FAILED TO DO WHAT WAS NORMALLY EXPECTED FROM YOU BECAUSE OF DRINKING: NEVER
SKIP TO QUESTIONS 9-10: 1
HAVE YOU OR SOMEONE ELSE BEEN INJURED AS A RESULT OF YOUR DRINKING: NO
HAS A RELATIVE, FRIEND, DOCTOR, OR ANOTHER HEALTH PROFESSIONAL EXPRESSED CONCERN ABOUT YOUR DRINKING OR SUGGESTED YOU CUT DOWN: NO
HOW OFTEN DURING THE LAST YEAR HAVE YOU FOUND THAT YOU WERE NOT ABLE TO STOP DRINKING ONCE YOU HAD STARTED: NEVER
AUDIT TOTAL SCORE: 0
HOW OFTEN DURING THE LAST YEAR HAVE YOU NEEDED AN ALCOHOLIC DRINK FIRST THING IN THE MORNING TO GET YOURSELF GOING AFTER A NIGHT OF HEAVY DRINKING: NEVER
AUDIT-C TOTAL SCORE: 0
HOW OFTEN DO YOU HAVE SIX OR MORE DRINKS ON ONE OCCASION: NEVER
HOW OFTEN DO YOU HAVE A DRINK CONTAINING ALCOHOL: NEVER
HOW OFTEN DURING THE LAST YEAR HAVE YOU HAD A FEELING OF GUILT OR REMORSE AFTER DRINKING: NEVER
HOW OFTEN DURING THE LAST YEAR HAVE YOU BEEN UNABLE TO REMEMBER WHAT HAPPENED THE NIGHT BEFORE BECAUSE YOU HAD BEEN DRINKING: NEVER
HOW MANY STANDARD DRINKS CONTAINING ALCOHOL DO YOU HAVE ON A TYPICAL DAY: PATIENT DOES NOT DRINK

## 2024-07-03 ASSESSMENT — PATIENT HEALTH QUESTIONNAIRE - PHQ9
1. LITTLE INTEREST OR PLEASURE IN DOING THINGS: NOT AT ALL
SUM OF ALL RESPONSES TO PHQ9 QUESTIONS 1 AND 2: 0
2. FEELING DOWN, DEPRESSED OR HOPELESS: NOT AT ALL

## 2024-07-03 ASSESSMENT — PAIN SCALES - GENERAL: PAINLEVEL: 0-NO PAIN

## 2024-07-03 NOTE — PROGRESS NOTES
[facility]: [facility]  PROGRESS NOTE      Eric Sanchez is a 59 y.o. male that is presenting today for Follow-up.      Subjective   Pt is a 59yr old male who presents to the office today for follow up on his blood work and rash. Mr. Sanchez has a PMH HTN and HLD. He was seen in the office on 6/27/24 related to an ED follow up and new onset rash. Mr. Sanchez had also been seen earlier in the day by [facility] where he was treated with Doxycyline 100mg BID x 21 weeks for new onset bullseye rash. He had been seen in the ED a few days prior related to increased fatigue fevers without a clear diagnosis. At his OV on on 6/27/24 I ordered a cbc, cmp and lyme disease pcr. I also referred him to ID. Mr. Sanchez lab work was without concern. Lyme disease was not detected.     Today Mr. Sanchez reports that he continues to take the doxycycline as ordered. He shares rash continues to resolve. Mr. Sanchez also reports that his fatigue is also nearly resolved with resumption of his baseline appetite. He will follow up with ID as he has left a msg with Dr. Wasserman's office to schedule his appt.      Rash  The current episode started 1 to 4 weeks ago. The problem has been gradually improving since onset.     Review of Systems   Skin:  Positive for rash.   All other systems reviewed and are negative.     Objective   Vitals:    07/03/24 1529   BP: 110/72   Pulse: 88   Temp: 36.4 °C (97.5 °F)   SpO2: 96%      Body mass index is 32.14 kg/m².  Physical Exam  Vitals reviewed.   Constitutional:       General: He is not in acute distress.     Appearance: Normal appearance. He is not ill-appearing, toxic-appearing or diaphoretic.   Cardiovascular:      Rate and Rhythm: Normal rate and regular rhythm.   Pulmonary:      Effort: Pulmonary effort is normal. No respiratory distress.      Breath sounds: Normal breath sounds. No stridor. No decreased breath sounds, wheezing, rhonchi or rales.   Chest:       "Chest wall: No tenderness.   Skin:     General: Skin is warm and dry.             Comments: Very faint resolving erythemic large circular rashes with and without bullseye appearance       Diagnostic Results   Lab Results   Component Value Date    GLUCOSE 117 (H) 06/27/2024    CALCIUM 9.5 06/27/2024     06/27/2024    K 5.1 06/27/2024    CO2 26 06/27/2024    CL 99 06/27/2024    BUN 16 06/27/2024    CREATININE 0.80 06/27/2024     Lab Results   Component Value Date    ALT 44 (H) 06/27/2024    AST 21 06/27/2024    ALKPHOS 108 06/27/2024    BILITOT 0.4 06/27/2024     Lab Results   Component Value Date    WBC 10.9 06/27/2024    HGB 12.6 (L) 06/27/2024    HCT 38.7 (L) 06/27/2024    MCV 92 06/27/2024     06/27/2024     Lab Results   Component Value Date    CHOL 279 (H) 03/10/2023    CHOL 296 (H) 03/03/2020     Lab Results   Component Value Date    HDL 51 03/10/2023    HDL 42 03/03/2020     Lab Results   Component Value Date    LDLCALC 188 (H) 03/10/2023    LDLCALC 216 (H) 03/03/2020     Lab Results   Component Value Date    TRIG 202 (H) 03/10/2023    TRIG 192 (H) 03/03/2020     No components found for: \"CHOLHDL\"  Lab Results   Component Value Date    HGBA1C 6.1 (H) 03/10/2023     Other labs not included in the list above were reviewed either before or during this encounter.    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     Past Surgical History:   Procedure Laterality Date    CHOLECYSTECTOMY      CIRCUMCISION, PRIMARY       Family History   Problem Relation Name Age of Onset    Breast cancer Mother Marjorie     Diabetes Father Chadwick 40 - 49        late 40s    Heart disease Father Chadwick 50 - 59        late 50s     Social History     Socioeconomic History    Marital status:      Spouse name: Not on file    Number of children: Not on file    Years of education: Not on file    " Highest education level: Not on file   Occupational History    Not on file   Tobacco Use    Smoking status: Never    Smokeless tobacco: Never   Vaping Use    Vaping status: Never Used   Substance and Sexual Activity    Alcohol use: Not Currently    Drug use: Never    Sexual activity: Yes     Partners: Female   Other Topics Concern    Not on file   Social History Narrative    Not on file     Social Determinants of Health     Financial Resource Strain: Not on file   Food Insecurity: Not on file   Transportation Needs: Not on file   Physical Activity: Not on file   Stress: Not on file   Social Connections: Not on file   Intimate Partner Violence: Not on file   Housing Stability: Not on file     Allergies   Allergen Reactions    Cephalexin Hives     Current Outpatient Medications on File Prior to Visit   Medication Sig Dispense Refill    aspirin 81 mg chewable tablet 1 tablet Orally Once a day      cholecalciferol, vitamin D3, (VITAMIN D3 ORAL) 2000 UNIT-  as directed Orally Daily with food      docosahexaenoic acid/epa (FISH OIL ORAL) 1000 MG- 3 tablets Orally daily      doxycycline (Adoxa) 100 mg tablet Take 1 tablet by mouth twice daily for 21 days 42 tablet 0    hydroCHLOROthiazide (HYDRODiuril) 25 mg tablet TAKE 1 TABLET BY MOUTH ONE TIME DAILY 90 tablet 3    lisinopril 40 mg tablet TAKE 1 TABLET BY MOUTH ONE TIME DAILY 90 tablet 3    multivitamin tablet 1 tablet Orally daily      potassium chloride CR (Klor-Con M20) 20 mEq ER tablet TAKE 1 TABLET BY MOUTH ONCE A DAY 90 tablet 3    sodium,potassium,mag sulfates (Suprep Bowel Prep Kit) 17.5-3.13-1.6 gram recon soln solution Drink one bottle by mouth twice as directed by the prep instructions 354 mL 0    triamcinolone (Kenalog) 0.1 % cream Apply to affected area(s) externally twice daily for 2 weeks 454 g 2    [DISCONTINUED] clobetasol (Temovate) 0.05 % cream 1 application to affected area Externally Twice a day      [DISCONTINUED] Klor-Con M20 20 mEq ER tablet        atorvastatin (Lipitor) 20 mg tablet 1 tablet Orally Once a day for 30 day(s)      [DISCONTINUED] lisinopril 40 mg tablet TAKE 1 TABLET BY MOUTH ONE TIME DAILY. for 30       No current facility-administered medications on file prior to visit.     Immunization History   Administered Date(s) Administered    DTaP vaccine, pediatric  (INFANRIX) 10/10/2013    Influenza, injectable, quadrivalent 10/09/2015    Moderna SARS-CoV-2 Vaccination 12/29/2020, 01/27/2021, 11/10/2021    Pfizer COVID-19 vaccine, Fall 2023, 12 years and older, (30mcg/0.3mL) 10/06/2023    Pfizer COVID-19 vaccine, bivalent, age 12 years and older (30 mcg/0.3 mL) 11/01/2022    Tdap vaccine, age 7 year and older (BOOSTRIX, ADACEL) 03/13/2023    Zoster vaccine, recombinant, adult (SHINGRIX) 11/02/2023, 01/04/2024     Patient's medical history was reviewed and updated either before or during this encounter.       Assessment/Plan   Problem List Items Addressed This Visit       Erythematous rash - Primary   Mr. Rodriguez's symtptoms are resolving. I am pleased with these findings. Normal electrolytes, CBC, and no detected lyme disease. Blood work not concerning. He will follow with Infectious Disease.     Mandie Ansari, APRN-CNP

## 2024-07-25 PROCEDURE — 88305 TISSUE EXAM BY PATHOLOGIST: CPT

## 2024-07-25 PROCEDURE — 88341 IMHCHEM/IMCYTCHM EA ADD ANTB: CPT

## 2024-07-25 PROCEDURE — 88342 IMHCHEM/IMCYTCHM 1ST ANTB: CPT

## 2024-07-29 ENCOUNTER — LAB REQUISITION (OUTPATIENT)
Dept: DERMATOPATHOLOGY | Facility: CLINIC | Age: 59
End: 2024-07-29
Payer: COMMERCIAL

## 2024-07-29 DIAGNOSIS — D48.5 NEOPLASM OF UNCERTAIN BEHAVIOR OF SKIN: ICD-10-CM

## 2024-08-01 LAB
LAB AP ASR DISCLAIMER: NORMAL
LABORATORY COMMENT REPORT: NORMAL
PATH REPORT.FINAL DX SPEC: NORMAL
PATH REPORT.GROSS SPEC: NORMAL
PATH REPORT.RELEVANT HX SPEC: NORMAL
PATH REPORT.TOTAL CANCER: NORMAL
PATHOLOGY SYNOPTIC REPORT: NORMAL

## 2024-08-06 ENCOUNTER — OFFICE VISIT (OUTPATIENT)
Dept: GASTROENTEROLOGY | Facility: CLINIC | Age: 59
End: 2024-08-06
Payer: COMMERCIAL

## 2024-08-06 ENCOUNTER — LAB (OUTPATIENT)
Dept: LAB | Facility: LAB | Age: 59
End: 2024-08-06
Payer: COMMERCIAL

## 2024-08-06 ENCOUNTER — PHARMACY VISIT (OUTPATIENT)
Dept: PHARMACY | Facility: CLINIC | Age: 59
End: 2024-08-06
Payer: COMMERCIAL

## 2024-08-06 VITALS
HEART RATE: 71 BPM | HEIGHT: 72 IN | SYSTOLIC BLOOD PRESSURE: 124 MMHG | OXYGEN SATURATION: 97 % | DIASTOLIC BLOOD PRESSURE: 81 MMHG | BODY MASS INDEX: 32.79 KG/M2 | WEIGHT: 242.1 LBS

## 2024-08-06 DIAGNOSIS — K76.0 HEPATIC STEATOSIS: ICD-10-CM

## 2024-08-06 DIAGNOSIS — R74.8 ELEVATED LIVER ENZYMES: Primary | ICD-10-CM

## 2024-08-06 DIAGNOSIS — D64.9 ANEMIA, UNSPECIFIED TYPE: ICD-10-CM

## 2024-08-06 LAB
BASOPHILS # BLD AUTO: 0.06 X10*3/UL (ref 0–0.1)
BASOPHILS NFR BLD AUTO: 0.8 %
EOSINOPHIL # BLD AUTO: 0 X10*3/UL (ref 0–0.7)
EOSINOPHIL NFR BLD AUTO: 0 %
ERYTHROCYTE [DISTWIDTH] IN BLOOD BY AUTOMATED COUNT: 13.3 % (ref 11.5–14.5)
EST. AVERAGE GLUCOSE BLD GHB EST-MCNC: 117 MG/DL
FERRITIN SERPL-MCNC: 165 NG/ML (ref 20–300)
HAV AB SER QL IA: REACTIVE
HBA1C MFR BLD: 5.7 %
HBV SURFACE AB SER-ACNC: <3.1 MIU/ML
HBV SURFACE AG SERPL QL IA: NONREACTIVE
HCT VFR BLD AUTO: 44.4 % (ref 41–52)
HCV AB SER QL: NONREACTIVE
HGB BLD-MCNC: 14.4 G/DL (ref 13.5–17.5)
IMM GRANULOCYTES # BLD AUTO: 0.06 X10*3/UL (ref 0–0.7)
IMM GRANULOCYTES NFR BLD AUTO: 0.8 % (ref 0–0.9)
IRON SATN MFR SERPL: 27 % (ref 25–45)
IRON SERPL-MCNC: 106 UG/DL (ref 35–150)
LYMPHOCYTES # BLD AUTO: 2.3 X10*3/UL (ref 1.2–4.8)
LYMPHOCYTES NFR BLD AUTO: 30.3 %
MCH RBC QN AUTO: 29.8 PG (ref 26–34)
MCHC RBC AUTO-ENTMCNC: 32.4 G/DL (ref 32–36)
MCV RBC AUTO: 92 FL (ref 80–100)
MONOCYTES # BLD AUTO: 0.44 X10*3/UL (ref 0.1–1)
MONOCYTES NFR BLD AUTO: 5.8 %
NEUTROPHILS # BLD AUTO: 4.74 X10*3/UL (ref 1.2–7.7)
NEUTROPHILS NFR BLD AUTO: 62.3 %
NRBC BLD-RTO: 0 /100 WBCS (ref 0–0)
PLATELET # BLD AUTO: 214 X10*3/UL (ref 150–450)
RBC # BLD AUTO: 4.83 X10*6/UL (ref 4.5–5.9)
TIBC SERPL-MCNC: 397 UG/DL (ref 240–445)
UIBC SERPL-MCNC: 291 UG/DL (ref 110–370)
WBC # BLD AUTO: 7.6 X10*3/UL (ref 4.4–11.3)

## 2024-08-06 PROCEDURE — 3074F SYST BP LT 130 MM HG: CPT | Performed by: INTERNAL MEDICINE

## 2024-08-06 PROCEDURE — 83550 IRON BINDING TEST: CPT

## 2024-08-06 PROCEDURE — 3008F BODY MASS INDEX DOCD: CPT | Performed by: INTERNAL MEDICINE

## 2024-08-06 PROCEDURE — 85025 COMPLETE CBC W/AUTO DIFF WBC: CPT

## 2024-08-06 PROCEDURE — 86803 HEPATITIS C AB TEST: CPT

## 2024-08-06 PROCEDURE — 83036 HEMOGLOBIN GLYCOSYLATED A1C: CPT

## 2024-08-06 PROCEDURE — 1036F TOBACCO NON-USER: CPT | Performed by: INTERNAL MEDICINE

## 2024-08-06 PROCEDURE — 87340 HEPATITIS B SURFACE AG IA: CPT

## 2024-08-06 PROCEDURE — 86706 HEP B SURFACE ANTIBODY: CPT

## 2024-08-06 PROCEDURE — 99244 OFF/OP CNSLTJ NEW/EST MOD 40: CPT | Performed by: INTERNAL MEDICINE

## 2024-08-06 PROCEDURE — 86708 HEPATITIS A ANTIBODY: CPT

## 2024-08-06 PROCEDURE — 3079F DIAST BP 80-89 MM HG: CPT | Performed by: INTERNAL MEDICINE

## 2024-08-06 PROCEDURE — 36415 COLL VENOUS BLD VENIPUNCTURE: CPT

## 2024-08-06 PROCEDURE — RXMED WILLOW AMBULATORY MEDICATION CHARGE

## 2024-08-06 PROCEDURE — 83540 ASSAY OF IRON: CPT

## 2024-08-06 PROCEDURE — 82728 ASSAY OF FERRITIN: CPT

## 2024-08-06 ASSESSMENT — ENCOUNTER SYMPTOMS
LOSS OF SENSATION IN FEET: 0
DEPRESSION: 0
OCCASIONAL FEELINGS OF UNSTEADINESS: 0

## 2024-08-06 ASSESSMENT — PAIN SCALES - GENERAL: PAINLEVEL: 0-NO PAIN

## 2024-08-06 NOTE — PROGRESS NOTES
HEPATOLOGY NEW PATIENT VISIT    August 6, 2024    Dr. Eitan Rodríguez       Patient Name:   MARICHUY ALONZO  Medical Record Number: [id number]  YOB: 1965    Dear Dr. Rodríguez,    I had the pleasure of seeing Marichuy Alonzo for consultation in the [facility] ([facility] office). History and physical examination was performed. Pertinent available laboratory, imaging, pathology results were reviewed.     History of Present Illness:   The patient is a 59 year old white male who is referred for evaluation of fatty liver disease.    The patient reports that he was diagnosed with Lyme disease recently in June 2024.  He had fatigue and a rash.  He was noted to have mildly elevated liver enzymes.  Ultrasound showed fatty liver disease.  He was referred to me for further evaluation.  He was eventually treated with doxycycline.    The patient has risk factors for fatty liver disease including diabetes, hyperlipidemia, and being overweight.  He has been prescribed a statin but has not yet taken it.  He has never been on therapy for diabetes.    The patient denied any past history of acute hepatitis or jaundice.      He has never had any manifestations of liver disease including no jaundice, ascites, hepatic encephalopathy, or variceal bleeding. He denied ever having a liver biopsy.    He presented today for evaluation.  He denied any specific liver-related complaints.     Past Medical/Surgical History:   Fatty liver, hyperlipidemia, Lyme disease, diabetes, left ear melanoma in situ.    Family History:   There is no known family history of liver disease.  His maternal grandmother might have had colon cancer.    Social History:   He does drink alcohol.  He reports that his use will vary from none up to about 2 drinks a day.  He denied tattoos.  He denied blood transfusions.  He denied intravenous drug use.  He denied tobacco use.    Review of systems: As noted above.  He reports that  the fatigue and rash have resolved since being treated for the Lyme disease.  He was told that his celiac panel was somewhat abnormal, but he has never had any issues with gluten.  He has intentionally lost about 30 pounds recently with diet and exercise.  No fever, chills, visual changes, auditory changes, shortness of breath, chest pain, abdominal pain, GI bleeding, diarrhea, constipation, depression, dysuria, hematuria, musculoskeletal issues, or rash.       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       Past Surgical History:   Procedure Laterality Date    CHOLECYSTECTOMY      CIRCUMCISION, PRIMARY         Family History   Problem Relation Name Age of Onset    Breast cancer Mother Marjorie     Diabetes Father Chadwick 40 - 49        late 40s    Heart disease Father Chadwick 50 - 59        late 50s       Social History     Socioeconomic History    Marital status:      Spouse name: Not on file    Number of children: Not on file    Years of education: Not on file    Highest education level: Not on file   Occupational History    Not on file   Tobacco Use    Smoking status: Never    Smokeless tobacco: Never   Vaping Use    Vaping status: Never Used   Substance and Sexual Activity    Alcohol use: Not Currently    Drug use: Never    Sexual activity: Yes     Partners: Female   Other Topics Concern    Not on file   Social History Narrative    Not on file     Social Determinants of Health     Financial Resource Strain: Not on file   Food Insecurity: Not on file   Transportation Needs: Not on file   Physical Activity: Not on file   Stress: Not on file   Social Connections: Not on file   Intimate Partner Violence: Not on file   Housing Stability: Not on file       Allergies and Current Medications  Allergies   Allergen Reactions    Cephalexin Hives      Current Outpatient Medications   Medication Sig Dispense Refill    aspirin 81 mg chewable tablet 1 tablet Orally Once a day      atorvastatin (Lipitor) 20 mg tablet 1 tablet Orally Once a day for 30 day(s)      cholecalciferol, vitamin D3, (VITAMIN D3 ORAL) 2000 UNIT-  as directed Orally Daily with food      docosahexaenoic acid/epa (FISH OIL ORAL) 1000 MG- 3 tablets Orally daily      hydroCHLOROthiazide (HYDRODiuril) 25 mg tablet TAKE 1 TABLET BY MOUTH ONE TIME DAILY 90 tablet 3    lisinopril 40 mg tablet TAKE 1 TABLET BY MOUTH ONE TIME DAILY 90 tablet 3    multivitamin tablet 1 tablet Orally daily      potassium chloride CR (Klor-Con M20) 20 mEq ER tablet TAKE 1 TABLET BY MOUTH ONCE A DAY 90 tablet 3    doxycycline (Adoxa) 100 mg tablet Take 1 tablet by mouth twice daily for 21 days 42 tablet 0    sodium,potassium,mag sulfates (Suprep Bowel Prep Kit) 17.5-3.13-1.6 gram recon soln solution Drink one bottle by mouth twice as directed by the prep instructions 354 mL 0    triamcinolone (Kenalog) 0.1 % cream Apply to affected area(s) externally twice daily for 2 weeks (Patient not taking: Reported on 8/6/2024) 454 g 2     No current facility-administered medications for this visit.        Physical Exam  /81 (BP Location: Right arm, Patient Position: Sitting, BP Cuff Size: Large adult)   Pulse 71   Ht 1.829 m (6')   Wt 110 kg (242 lb 1.6 oz)   SpO2 97%   BMI 32.83 kg/m²       Physical Examination:   General Appearance: alert and in no acute distress.   HEENT: oropharynx without lesions. Anicteric sclerae.   Neck supple, nontender, without adenopathy, thyromegaly, or JVD.   Lungs clear to auscultation and percussion.   Heart RRR without murmurs, rubs, or gallops.   Abdomen: Soft, nontender, bowel sounds positive, without obvious ascites. Liver and spleen not palpable.   Extremities full ROM, no atrophy, normal strength. No edema.   Skin no specific lesions.   Neurological exam nonfocal, alert and oriented.   No asterixis.  No spider angiomata, or palmar erythema.     Labs 6/27/2024 glucose 117, sodium 135, creatinine 0.8, protein 6.9, albumin 3.8, alk phos 108, bilirubin 0.4, AST 21, ALT 44, WBC 10.9, hemoglobin 12.6, platelets 244.    Labs 6/20/2024 deamidated gliadin antibody IgG elevated at 8.03, TTG negative, TTG IgA negative, deamidated gliadin antibody IgA normal.    Labs 6/16/2024 urine drug screen negative, alcohol negative, lipase 29, TSH 0.84.    Labs 3/10/2023 cholesterol 279, triglycerides 202, , hemoglobin A1c 6.1%.    Labs 8/3/2020 AFP < 3.    Labs 3/3/2020 hemoglobin A1c 6.7%, cholesterol 296, , triglycerides 192.    Ultrasound 6/24/2024:  IMPRESSION:  Hepatomegaly with hepatic steatosis.      Complex cyst of the left hepatic lobe measuring up to 1.2 cm.    CAT scan with contrast 4/14/2020:  IMPRESSION:  Two lesions within the liver measuring 3 mm and 4 mm respectively likely  representing cysts.    Ultrasound 3/18/2020:  IMPRESSION:  1. Fatty metamorphosis of the liver.  2. Post cholecystectomy with prominence of the common duct. Correlate with  bilirubin and alkaline phosphatase levels.        Assessment/Plan:     1. Fatty liver disease    The patient is referred to me for evaluation of fatty liver disease.    We did speak about fatty liver disease. I explained that the risk factors include diabetes, obesity, hyperlipidemia and alcohol use. I explained that he needs to work on diet, weight loss and exercise.  He also needs to work aggressively with his primary provider about hyperlipidemia and diabetes.  I did explain that 20-25% of patients with WORLEY may proceed to cirrhosis.    I reminded him that working on the same risk factors could decrease his future risk of heart disease and stroke.    He did have an elevated hemoglobin A1c several years ago which did improve.  I will recheck it now, especially with the weight loss.  If it is still elevated, his PCP may need to consider treating  this as well.    He has been told for the last few years that he needs medication for his hyperlipidemia.  He has not yet started it.  I did recommend that he started as prescribed or at least discuss it with his PCP.    I will stage his liver disease with a FibroSCAN test.    With his essentially normal LFTs, I will only do some minimal serologies including hepatitis serologies, iron panel and ferritin.    He can follow-up with [facility] for his GI needs.    Thank you for allowing me to participate in the care of this patient. Please feel free to contact me with any questions regarding their care.     Sincerely,     Mike Burroughs MD, FAASLD, FACG.   Medical Director, Hepatology  Senior Attending Physician  [facility] [place]  [facility]  Professor of Medicine  Division of Gastroenterology and Liver Disease  [facility] [employer]  [address] [place], [place] [address]  Phone: [phone]  Fax: [phone].    Cc: [facility] Lai      This document was generated with a computerized dictation system.  Because of this, there could be errors in grammar and/or content.

## 2024-08-06 NOTE — PATIENT INSTRUCTIONS
Continue to work on diet, weight loss and exercise.    Get lab tests.    Get a FibroScan ultrasound of the liver.

## 2024-08-07 ENCOUNTER — TELEPHONE (OUTPATIENT)
Dept: GASTROENTEROLOGY | Facility: HOSPITAL | Age: 59
End: 2024-08-07
Payer: COMMERCIAL

## 2024-08-07 NOTE — TELEPHONE ENCOUNTER
Spoke to patient discussed lab results including that he is immune to hepatitis A. Questions answered.

## 2024-08-08 PROCEDURE — 88305 TISSUE EXAM BY PATHOLOGIST: CPT

## 2024-08-12 ENCOUNTER — LAB REQUISITION (OUTPATIENT)
Dept: DERMATOPATHOLOGY | Facility: CLINIC | Age: 59
End: 2024-08-12
Payer: COMMERCIAL

## 2024-08-12 DIAGNOSIS — D03.22: ICD-10-CM

## 2024-08-13 LAB
LABORATORY COMMENT REPORT: NORMAL
PATH REPORT.FINAL DX SPEC: NORMAL
PATH REPORT.GROSS SPEC: NORMAL
PATH REPORT.MICROSCOPIC SPEC OTHER STN: NORMAL
PATH REPORT.RELEVANT HX SPEC: NORMAL
PATH REPORT.TOTAL CANCER: NORMAL

## 2024-08-26 ENCOUNTER — CLINICAL SUPPORT (OUTPATIENT)
Dept: GASTROENTEROLOGY | Facility: CLINIC | Age: 59
End: 2024-08-26
Payer: COMMERCIAL

## 2024-08-26 DIAGNOSIS — K76.0 HEPATIC STEATOSIS: ICD-10-CM

## 2024-08-26 PROCEDURE — 91200 LIVER ELASTOGRAPHY: CPT | Performed by: INTERNAL MEDICINE

## 2024-08-30 ENCOUNTER — TELEPHONE (OUTPATIENT)
Dept: GASTROENTEROLOGY | Facility: HOSPITAL | Age: 59
End: 2024-08-30
Payer: COMMERCIAL

## 2024-08-30 NOTE — TELEPHONE ENCOUNTER
Called patient to discuss results and plan per Dr Burroughs:  HEPATOLOGY ATTENDING NOTE     I personally reviewed and interpreted the FibroSCAN results.     It revealed a median liver stiffness of 4.5 kPa with an IQR of 6% and a   CAP of 239.     This is consistent with stage 0-1 disease and mild steatosis.     He should work on WORLEY risk factors.  He should look into getting   hepatitis B vaccines.  He can get LFTs in 1 year.  He can see me back in   clinic in 1 year.     UMER Burroughs.

## 2024-09-03 ENCOUNTER — PATIENT MESSAGE (OUTPATIENT)
Dept: PRIMARY CARE | Facility: CLINIC | Age: 59
End: 2024-09-03
Payer: COMMERCIAL

## 2024-09-03 DIAGNOSIS — E78.2 MIXED HYPERLIPIDEMIA: Primary | ICD-10-CM

## 2024-09-22 PROCEDURE — RXMED WILLOW AMBULATORY MEDICATION CHARGE

## 2024-09-24 RX ORDER — SODIUM CHLORIDE, SODIUM LACTATE, POTASSIUM CHLORIDE, CALCIUM CHLORIDE 600; 310; 30; 20 MG/100ML; MG/100ML; MG/100ML; MG/100ML
20 INJECTION, SOLUTION INTRAVENOUS CONTINUOUS
Status: CANCELLED | OUTPATIENT
Start: 2024-09-24

## 2024-09-24 NOTE — H&P
"History Of Present Illness  Eric Sanchez \"Saundra" is a 59 y.o. male presenting with colon polyps and abnormal celiac panel.     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 He has never used smokeless tobacco. He reports that he does not currently use alcohol. He reports that he does not use drugs.    Family History  Family History   Problem Relation Name Age of Onset    Breast cancer Mother Marjorie     Diabetes Father Chadwick 40 - 49        late 40s    Heart disease Father Chadwick 50 - 59        late 50s        Allergies  Allergies   Allergen Reactions    Cephalexin Hives     Review of Systems     Physical Exam  Vitals and nursing note reviewed.   Constitutional:       Appearance: Normal appearance.   Cardiovascular:      Rate and Rhythm: Normal rate and regular rhythm.      Pulses: Normal pulses.      Heart sounds: Normal heart sounds.   Pulmonary:      Effort: Pulmonary effort is normal.      Breath sounds: Normal breath sounds.   Abdominal:      General: Abdomen is flat.      Palpations: Abdomen is soft.   Neurological:      Mental Status: He is alert.          Last Recorded Vitals  There were no vitals taken for this visit.    Assessment/Plan   Colon adenomas and abnormal celiac panel    Proceed with EGD and colon     Shawn Cabrera MD  "

## 2024-09-25 ENCOUNTER — ANESTHESIA (OUTPATIENT)
Dept: GASTROENTEROLOGY | Facility: HOSPITAL | Age: 59
End: 2024-09-25
Payer: COMMERCIAL

## 2024-09-25 ENCOUNTER — HOSPITAL ENCOUNTER (OUTPATIENT)
Dept: GASTROENTEROLOGY | Facility: HOSPITAL | Age: 59
Discharge: HOME | End: 2024-09-25
Payer: COMMERCIAL

## 2024-09-25 ENCOUNTER — ANESTHESIA EVENT (OUTPATIENT)
Dept: GASTROENTEROLOGY | Facility: HOSPITAL | Age: 59
End: 2024-09-25
Payer: COMMERCIAL

## 2024-09-25 VITALS
TEMPERATURE: 97.7 F | WEIGHT: 239.42 LBS | DIASTOLIC BLOOD PRESSURE: 75 MMHG | HEIGHT: 72 IN | HEART RATE: 63 BPM | RESPIRATION RATE: 18 BRPM | SYSTOLIC BLOOD PRESSURE: 111 MMHG | OXYGEN SATURATION: 98 % | BODY MASS INDEX: 32.43 KG/M2

## 2024-09-25 DIAGNOSIS — R89.4 ABNORMAL CELIAC ANTIBODY PANEL: ICD-10-CM

## 2024-09-25 DIAGNOSIS — Z12.11 SCREENING FOR COLON CANCER: ICD-10-CM

## 2024-09-25 PROCEDURE — 2500000004 HC RX 250 GENERAL PHARMACY W/ HCPCS (ALT 636 FOR OP/ED): Performed by: REGISTERED NURSE

## 2024-09-25 PROCEDURE — 7100000010 HC PHASE TWO TIME - EACH INCREMENTAL 1 MINUTE

## 2024-09-25 PROCEDURE — 45385 COLONOSCOPY W/LESION REMOVAL: CPT | Performed by: INTERNAL MEDICINE

## 2024-09-25 PROCEDURE — 7100000009 HC PHASE TWO TIME - INITIAL BASE CHARGE

## 2024-09-25 PROCEDURE — 2720000007 HC OR 272 NO HCPCS

## 2024-09-25 PROCEDURE — 3700000001 HC GENERAL ANESTHESIA TIME - INITIAL BASE CHARGE

## 2024-09-25 PROCEDURE — A45385 PR COLONOSCOPY,REMV LESN,SNARE: Performed by: ANESTHESIOLOGY

## 2024-09-25 PROCEDURE — 43239 EGD BIOPSY SINGLE/MULTIPLE: CPT | Performed by: INTERNAL MEDICINE

## 2024-09-25 PROCEDURE — 3700000002 HC GENERAL ANESTHESIA TIME - EACH INCREMENTAL 1 MINUTE

## 2024-09-25 PROCEDURE — 2500000005 HC RX 250 GENERAL PHARMACY W/O HCPCS: Performed by: REGISTERED NURSE

## 2024-09-25 RX ORDER — PROPOFOL 10 MG/ML
INJECTION, EMULSION INTRAVENOUS AS NEEDED
Status: DISCONTINUED | OUTPATIENT
Start: 2024-09-25 | End: 2024-09-25

## 2024-09-25 RX ORDER — FENTANYL CITRATE 50 UG/ML
INJECTION, SOLUTION INTRAMUSCULAR; INTRAVENOUS AS NEEDED
Status: DISCONTINUED | OUTPATIENT
Start: 2024-09-25 | End: 2024-09-25

## 2024-09-25 RX ORDER — SODIUM CHLORIDE, SODIUM LACTATE, POTASSIUM CHLORIDE, CALCIUM CHLORIDE 600; 310; 30; 20 MG/100ML; MG/100ML; MG/100ML; MG/100ML
INJECTION, SOLUTION INTRAVENOUS CONTINUOUS PRN
Status: DISCONTINUED | OUTPATIENT
Start: 2024-09-25 | End: 2024-09-25

## 2024-09-25 RX ORDER — LIDOCAINE HYDROCHLORIDE 20 MG/ML
INJECTION, SOLUTION INFILTRATION; PERINEURAL AS NEEDED
Status: DISCONTINUED | OUTPATIENT
Start: 2024-09-25 | End: 2024-09-25

## 2024-09-25 SDOH — HEALTH STABILITY: MENTAL HEALTH: CURRENT SMOKER: 0

## 2024-09-25 ASSESSMENT — PAIN SCALES - GENERAL
PAINLEVEL_OUTOF10: 0 - NO PAIN
PAIN_LEVEL: 4
PAINLEVEL_OUTOF10: 0 - NO PAIN

## 2024-09-25 ASSESSMENT — COLUMBIA-SUICIDE SEVERITY RATING SCALE - C-SSRS
2. HAVE YOU ACTUALLY HAD ANY THOUGHTS OF KILLING YOURSELF?: NO
6. HAVE YOU EVER DONE ANYTHING, STARTED TO DO ANYTHING, OR PREPARED TO DO ANYTHING TO END YOUR LIFE?: NO
1. IN THE PAST MONTH, HAVE YOU WISHED YOU WERE DEAD OR WISHED YOU COULD GO TO SLEEP AND NOT WAKE UP?: NO

## 2024-09-25 ASSESSMENT — PAIN - FUNCTIONAL ASSESSMENT
PAIN_FUNCTIONAL_ASSESSMENT: 0-10

## 2024-09-25 NOTE — ANESTHESIA POSTPROCEDURE EVALUATION
"Patient: Eric Sanchez \"Thomas\"    Procedure Summary       Date: 09/25/24 Room / Location: [facility] [place]    Anesthesia Start: 1312 Anesthesia Stop: 1357    Procedures:       COLONOSCOPY      EGD Diagnosis:       Screening for colon cancer      Abnormal celiac antibody panel    Scheduled Providers: Shawn Cabrera MD; Saad Shipman MD; EUGENE Heller-JEFFREY, DNAP Responsible Provider: Saad Shipman MD    Anesthesia Type: MAC ASA Status: 2            Anesthesia Type: MAC    Vitals Value Taken Time   /72 09/25/24 1406   Temp 36.6 °C (97.9 °F) 09/25/24 1351   Pulse 72 09/25/24 1406   Resp 21 09/25/24 1406   SpO2 96 % 09/25/24 1406       Anesthesia Post Evaluation    Patient location during evaluation: PACU  Patient participation: complete - patient participated  Level of consciousness: awake and alert  Pain score: 4  Pain management: adequate  Airway patency: patent  Cardiovascular status: acceptable  Respiratory status: acceptable  Hydration status: acceptable  Postoperative Nausea and Vomiting: none    No notable events documented.    "

## 2024-09-25 NOTE — DISCHARGE INSTRUCTIONS

## 2024-09-25 NOTE — ANESTHESIA PREPROCEDURE EVALUATION
"Patient: Eric Sanchez \"Thomas\"    Procedure Information       Date/Time: 09/25/24 1230    Scheduled providers: Shawn Cabrera MD; Saad Shipman MD; EUGENE Heller-JEFFREY, DNAKENNY    Procedures:       COLONOSCOPY      EGD    Location: [facility]            Relevant Problems   Cardiac   (+) Essential hypertension   (+) Hyperlipidemia      /Renal   (+) Hyponatremia      ID   (+) Viral illness      Skin   (+) Erythematous rash       Clinical information reviewed:   Tobacco  Allergies  Meds  Problems  Med Hx  Surg Hx   Fam Hx  Soc   Hx        NPO Detail:  NPO/Void Status  Carbohydrate Drink Given Prior to Surgery? : N  Date of Last Liquid: 09/25/24  Time of Last Liquid: 0500  Date of Last Solid: 09/23/24  Time of Last Solid: 0000  Last Intake Type: Clear fluids  Time of Last Void: 1100         Physical Exam    Airway  Mallampati: I  TM distance: >3 FB  Neck ROM: full     Cardiovascular - normal exam     Dental - normal exam     Pulmonary - normal exam     Abdominal - normal exam         Anesthesia Plan    History of general anesthesia?: yes  History of complications of general anesthesia?: no    ASA 2     MAC     The patient is not a current smoker.  Patient was not previously instructed to abstain from smoking on day of procedure.  Patient did not smoke on day of procedure.    intravenous induction   Postoperative administration of opioids is intended.  Anesthetic plan and risks discussed with patient.  Use of blood products discussed with patient who.    Plan discussed with CAA.    "

## 2024-09-26 ASSESSMENT — PAIN SCALES - GENERAL: PAINLEVEL_OUTOF10: 0 - NO PAIN

## 2024-09-28 ENCOUNTER — PHARMACY VISIT (OUTPATIENT)
Dept: PHARMACY | Facility: CLINIC | Age: 59
End: 2024-09-28
Payer: COMMERCIAL

## 2024-09-30 ENCOUNTER — PATIENT MESSAGE (OUTPATIENT)
Dept: PRIMARY CARE | Facility: CLINIC | Age: 59
End: 2024-09-30
Payer: COMMERCIAL

## 2024-09-30 DIAGNOSIS — Z12.5 SCREENING FOR PROSTATE CANCER: Primary | ICD-10-CM

## 2024-10-02 ENCOUNTER — LAB (OUTPATIENT)
Dept: LAB | Facility: LAB | Age: 59
End: 2024-10-02
Payer: COMMERCIAL

## 2024-10-02 DIAGNOSIS — E78.2 MIXED HYPERLIPIDEMIA: ICD-10-CM

## 2024-10-02 DIAGNOSIS — Z12.5 SCREENING FOR PROSTATE CANCER: ICD-10-CM

## 2024-10-02 LAB
ALBUMIN SERPL BCP-MCNC: 4.6 G/DL (ref 3.4–5)
ALP SERPL-CCNC: 77 U/L (ref 33–120)
ALT SERPL W P-5'-P-CCNC: 19 U/L (ref 10–52)
ANION GAP SERPL CALCULATED.3IONS-SCNC: 11 MMOL/L (ref 10–20)
AST SERPL W P-5'-P-CCNC: 19 U/L (ref 9–39)
BILIRUB SERPL-MCNC: 0.5 MG/DL (ref 0–1.2)
BUN SERPL-MCNC: 15 MG/DL (ref 6–23)
CALCIUM SERPL-MCNC: 9.7 MG/DL (ref 8.6–10.3)
CHLORIDE SERPL-SCNC: 103 MMOL/L (ref 98–107)
CHOLEST SERPL-MCNC: 163 MG/DL (ref 0–199)
CHOLEST/HDLC SERPL: 4.1 {RATIO}
CO2 SERPL-SCNC: 29 MMOL/L (ref 21–32)
CREAT SERPL-MCNC: 0.87 MG/DL (ref 0.5–1.3)
EGFRCR SERPLBLD CKD-EPI 2021: >90 ML/MIN/1.73M*2
GLUCOSE SERPL-MCNC: 114 MG/DL (ref 74–99)
HDLC SERPL-MCNC: 39.3 MG/DL
LDLC SERPL CALC-MCNC: 89 MG/DL
NON HDL CHOLESTEROL: 124 MG/DL (ref 0–149)
POTASSIUM SERPL-SCNC: 4 MMOL/L (ref 3.5–5.3)
PROT SERPL-MCNC: 7 G/DL (ref 6.4–8.2)
PSA SERPL-MCNC: 0.93 NG/ML
SODIUM SERPL-SCNC: 139 MMOL/L (ref 136–145)
TRIGL SERPL-MCNC: 172 MG/DL (ref 0–149)
VLDL: 34 MG/DL (ref 0–40)

## 2024-10-02 PROCEDURE — 80053 COMPREHEN METABOLIC PANEL: CPT

## 2024-10-02 PROCEDURE — 36415 COLL VENOUS BLD VENIPUNCTURE: CPT

## 2024-10-02 PROCEDURE — 84153 ASSAY OF PSA TOTAL: CPT

## 2024-10-02 PROCEDURE — 80061 LIPID PANEL: CPT

## 2024-10-03 ASSESSMENT — ENCOUNTER SYMPTOMS
APPETITE CHANGE: 0
SHORTNESS OF BREATH: 0
CHILLS: 0
FEVER: 0
NAUSEA: 0
ABDOMINAL PAIN: 0
HEADACHES: 0
VOMITING: 0
COUGH: 0
DIARRHEA: 0

## 2024-10-03 NOTE — PROGRESS NOTES
[facility]: [facility]  PROGRESS NOTE      Eric Snachez is a 59 y.o. male that is presenting today for Follow-up.    Assessment/Plan   Diagnoses and all orders for this visit:  Mixed hyperlipidemia  Essential hypertension  Hyperglycemia  Vitamin D deficiency  Encounter for prostate cancer screening  Annual physical exam  -     CBC and Auto Differential; Future  -     Comprehensive Metabolic Panel; Future  -     Lipid Panel; Future  -     Hemoglobin A1C; Future  -     Vitamin D 25-Hydroxy,Total (for eval of Vitamin D levels); Future  -     TSH with reflex to Free T4 if abnormal; Future  -     Prostate Specific Antigen; Future  Encounter for immunization  -     Flu vaccine, trivalent, preservative free, age 6 months and greater (Fluarix/Fluzone/Flulaval)  Screening for cardiovascular condition  -     CT cardiac scoring wo IV contrast; Future  Other orders  -     Follow Up In Primary Care - Established; Future    Continue the statin as we are getting excellent results and he has no adverse effects.  Blood pressure controlled on lisinopril and hydrochlorothiazide.  Reviewed the results of his hepatology consultation.  Recent EGD and the pathology is pending at this time.  Reviewed the results of coronary artery calcium scoring and we will proceed.    Subjective   HPI  59 y.o. male here for follow up.  Doing well on the statin without adverse effects.  Recent EGD, path pending.  Saw hepatology without any significant concern for underlying liver disease.    Review of Systems   Constitutional:  Negative for appetite change, chills and fever.   Respiratory:  Negative for cough and shortness of breath.    Cardiovascular:  Negative for chest pain.   Gastrointestinal:  Negative for abdominal pain, diarrhea, nausea and vomiting.   Neurological:  Negative for headaches.   All other systems reviewed and are negative.     Objective   Vitals:    10/04/24 1344   BP: 120/82   Pulse: 52   Temp: 35.6 °C  "(96 °F)   SpO2: 97%      Body mass index is 32.01 kg/m².  Physical Exam  Vitals reviewed.   Constitutional:       General: He is not in acute distress.     Appearance: He is not toxic-appearing.   HENT:      Head: Normocephalic and atraumatic.      Mouth/Throat:      Mouth: Mucous membranes are moist.   Eyes:      Pupils: Pupils are equal, round, and reactive to light.   Cardiovascular:      Rate and Rhythm: Normal rate and regular rhythm.      Heart sounds: No murmur heard.  Pulmonary:      Breath sounds: Normal breath sounds. No wheezing, rhonchi or rales.   Abdominal:      General: There is no distension.      Palpations: Abdomen is soft.   Musculoskeletal:      Right lower leg: No edema.      Left lower leg: No edema.   Neurological:      General: No focal deficit present.      Mental Status: He is alert and oriented to person, place, and time.       Diagnostic Results   Lab Results   Component Value Date    GLUCOSE 114 (H) 10/02/2024    CALCIUM 9.7 10/02/2024     10/02/2024    K 4.0 10/02/2024    CO2 29 10/02/2024     10/02/2024    BUN 15 10/02/2024    CREATININE 0.87 10/02/2024     Lab Results   Component Value Date    ALT 19 10/02/2024    AST 19 10/02/2024    ALKPHOS 77 10/02/2024    BILITOT 0.5 10/02/2024     Lab Results   Component Value Date    WBC 7.6 08/06/2024    HGB 14.4 08/06/2024    HCT 44.4 08/06/2024    MCV 92 08/06/2024     08/06/2024     Lab Results   Component Value Date    CHOL 163 10/02/2024    CHOL 279 (H) 03/10/2023    CHOL 296 (H) 03/03/2020     Lab Results   Component Value Date    HDL 39.3 10/02/2024    HDL 51 03/10/2023    HDL 42 03/03/2020     Lab Results   Component Value Date    LDLCALC 89 10/02/2024    LDLCALC 188 (H) 03/10/2023    LDLCALC 216 (H) 03/03/2020     Lab Results   Component Value Date    TRIG 172 (H) 10/02/2024    TRIG 202 (H) 03/10/2023    TRIG 192 (H) 03/03/2020     No components found for: \"CHOLHDL\"  Lab Results   Component Value Date    HGBA1C 5.7 " (H) 08/06/2024     Other labs not included in the list above were reviewed either before or during this encounter.    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    Psoriasis      Past Surgical History:   Procedure Laterality Date    CHOLECYSTECTOMY      CIRCUMCISION, PRIMARY      COLONOSCOPY W/ BIOPSIES AND POLYPECTOMY       Family History   Problem Relation Name Age of Onset    Breast cancer Mother Marjorie     Diabetes Father Chadwick 40 - 49        late 40s    Heart disease Father Chadwick 50 - 59        late 50s     Social History     Socioeconomic History    Marital status:      Spouse name: Not on file    Number of children: Not on file    Years of education: Not on file    Highest education level: Not on file   Occupational History    Not on file   Tobacco Use    Smoking status: Never    Smokeless tobacco: Never   Vaping Use    Vaping status: Never Used   Substance and Sexual Activity    Alcohol use: Not Currently     Alcohol/week: 2.0 standard drinks of alcohol    Drug use: Never    Sexual activity: Yes     Partners: Female   Other Topics Concern    Not on file   Social History Narrative    Not on file     Social Determinants of Health     Financial Resource Strain: Not on file   Food Insecurity: Not on file   Transportation Needs: Not on file   Physical Activity: Not on file   Stress: Not on file   Social Connections: Not on file   Intimate Partner Violence: Not on file   Housing Stability: Not on file     Allergies   Allergen Reactions    Cephalexin Hives     Current Outpatient Medications on File Prior to Visit   Medication Sig Dispense Refill    aspirin 81 mg chewable tablet 1 tablet Orally Once a day      atorvastatin (Lipitor) 20 mg tablet Take 1 tablet (20 mg) by mouth once daily. 90 tablet 3    cholecalciferol, vitamin D3, (VITAMIN  D3 ORAL) 2000 UNIT-  as directed Orally Daily with food      docosahexaenoic acid/epa (FISH OIL ORAL) 1000 MG- 3 tablets Orally daily      hydroCHLOROthiazide (HYDRODiuril) 25 mg tablet TAKE 1 TABLET BY MOUTH ONE TIME DAILY 90 tablet 3    lisinopril 40 mg tablet TAKE 1 TABLET BY MOUTH ONE TIME DAILY 90 tablet 3    multivitamin tablet 1 tablet Orally daily      potassium chloride CR (Klor-Con M20) 20 mEq ER tablet TAKE 1 TABLET BY MOUTH ONCE A DAY 90 tablet 3    sodium,potassium,mag sulfates (Suprep Bowel Prep Kit) 17.5-3.13-1.6 gram recon soln solution Drink one bottle by mouth twice as directed by the prep instructions 354 mL 0     No current facility-administered medications on file prior to visit.     Immunization History   Administered Date(s) Administered    DTaP vaccine, pediatric  (INFANRIX) 10/10/2013    Flu vaccine, trivalent, preservative free, age 6 months and greater (Fluarix/Fluzone/Flulaval) 10/04/2024    Influenza, injectable, quadrivalent 10/09/2015    Moderna SARS-CoV-2 Vaccination 12/29/2020, 01/27/2021, 11/10/2021    Pfizer COVID-19 vaccine, 12 years and older, (30mcg/0.3mL) (Comirnaty) 10/06/2023    Pfizer COVID-19 vaccine, bivalent, age 12 years and older (30 mcg/0.3 mL) 11/01/2022    Tdap vaccine, age 7 year and older (BOOSTRIX, ADACEL) 03/13/2023    Zoster vaccine, recombinant, adult (SHINGRIX) 11/02/2023, 01/04/2024     Patient's medical history was reviewed and updated either before or during this encounter.       Eitan Rodríguez MD

## 2024-10-04 ENCOUNTER — PATIENT MESSAGE (OUTPATIENT)
Dept: PRIMARY CARE | Facility: CLINIC | Age: 59
End: 2024-10-04

## 2024-10-04 ENCOUNTER — OFFICE VISIT (OUTPATIENT)
Dept: PRIMARY CARE | Facility: CLINIC | Age: 59
End: 2024-10-04
Payer: COMMERCIAL

## 2024-10-04 VITALS
OXYGEN SATURATION: 97 % | DIASTOLIC BLOOD PRESSURE: 82 MMHG | SYSTOLIC BLOOD PRESSURE: 120 MMHG | TEMPERATURE: 96 F | BODY MASS INDEX: 31.97 KG/M2 | WEIGHT: 236 LBS | HEART RATE: 52 BPM | HEIGHT: 72 IN

## 2024-10-04 DIAGNOSIS — I10 ESSENTIAL HYPERTENSION: ICD-10-CM

## 2024-10-04 DIAGNOSIS — R73.9 HYPERGLYCEMIA: ICD-10-CM

## 2024-10-04 DIAGNOSIS — E55.9 VITAMIN D DEFICIENCY: ICD-10-CM

## 2024-10-04 DIAGNOSIS — Z12.5 ENCOUNTER FOR PROSTATE CANCER SCREENING: ICD-10-CM

## 2024-10-04 DIAGNOSIS — Z23 ENCOUNTER FOR IMMUNIZATION: ICD-10-CM

## 2024-10-04 DIAGNOSIS — Z13.6 SCREENING FOR CARDIOVASCULAR CONDITION: ICD-10-CM

## 2024-10-04 DIAGNOSIS — Z00.00 ANNUAL PHYSICAL EXAM: ICD-10-CM

## 2024-10-04 DIAGNOSIS — E78.2 MIXED HYPERLIPIDEMIA: Primary | ICD-10-CM

## 2024-10-04 LAB
LAB AP ASR DISCLAIMER: NORMAL
LABORATORY COMMENT REPORT: NORMAL
PATH REPORT.FINAL DX SPEC: NORMAL
PATH REPORT.GROSS SPEC: NORMAL
PATH REPORT.RELEVANT HX SPEC: NORMAL
PATH REPORT.TOTAL CANCER: NORMAL
RESIDENT REVIEW: NORMAL

## 2024-10-04 ASSESSMENT — PATIENT HEALTH QUESTIONNAIRE - PHQ9
2. FEELING DOWN, DEPRESSED OR HOPELESS: NOT AT ALL
SUM OF ALL RESPONSES TO PHQ9 QUESTIONS 1 AND 2: 0
1. LITTLE INTEREST OR PLEASURE IN DOING THINGS: NOT AT ALL

## 2024-10-04 ASSESSMENT — PAIN SCALES - GENERAL: PAINLEVEL: 0-NO PAIN

## 2024-10-07 ENCOUNTER — PHARMACY VISIT (OUTPATIENT)
Dept: PHARMACY | Facility: CLINIC | Age: 59
End: 2024-10-07
Payer: COMMERCIAL

## 2024-10-07 DIAGNOSIS — A04.8 H. PYLORI INFECTION: Primary | ICD-10-CM

## 2024-10-07 PROCEDURE — RXMED WILLOW AMBULATORY MEDICATION CHARGE

## 2024-10-07 RX ORDER — TETRACYCLINE HYDROCHLORIDE 500 MG/1
500 CAPSULE ORAL 4 TIMES DAILY
Qty: 56 CAPSULE | Refills: 0 | Status: SHIPPED | OUTPATIENT
Start: 2024-10-07 | End: 2024-10-21

## 2024-10-07 RX ORDER — METRONIDAZOLE 500 MG/1
500 TABLET ORAL 2 TIMES DAILY
Qty: 28 TABLET | Refills: 0 | Status: SHIPPED | OUTPATIENT
Start: 2024-10-07 | End: 2024-10-21

## 2024-10-07 RX ORDER — BISMUTH SUBSALICYLATE 262 MG/1
524 TABLET ORAL 2 TIMES DAILY
Qty: 60 TABLET | Refills: 0 | Status: SHIPPED | OUTPATIENT
Start: 2024-10-07 | End: 2024-10-22

## 2024-10-07 RX ORDER — OMEPRAZOLE 40 MG/1
40 CAPSULE, DELAYED RELEASE ORAL
Qty: 28 CAPSULE | Refills: 0 | Status: SHIPPED | OUTPATIENT
Start: 2024-10-07 | End: 2024-10-21

## 2024-10-28 PROCEDURE — RXMED WILLOW AMBULATORY MEDICATION CHARGE

## 2024-11-01 ENCOUNTER — PHARMACY VISIT (OUTPATIENT)
Dept: PHARMACY | Facility: CLINIC | Age: 59
End: 2024-11-01
Payer: COMMERCIAL

## 2024-12-24 PROCEDURE — RXMED WILLOW AMBULATORY MEDICATION CHARGE

## 2024-12-28 ENCOUNTER — PHARMACY VISIT (OUTPATIENT)
Dept: PHARMACY | Facility: CLINIC | Age: 59
End: 2024-12-28
Payer: COMMERCIAL

## 2025-01-26 PROCEDURE — RXMED WILLOW AMBULATORY MEDICATION CHARGE

## 2025-02-01 ENCOUNTER — PHARMACY VISIT (OUTPATIENT)
Dept: PHARMACY | Facility: CLINIC | Age: 60
End: 2025-02-01
Payer: COMMERCIAL

## 2025-03-10 ENCOUNTER — HOSPITAL ENCOUNTER (OUTPATIENT)
Dept: RADIOLOGY | Facility: HOSPITAL | Age: 60
Discharge: HOME | End: 2025-03-10
Payer: COMMERCIAL

## 2025-03-10 DIAGNOSIS — Z13.6 SCREENING FOR CARDIOVASCULAR CONDITION: ICD-10-CM

## 2025-03-10 PROCEDURE — 75571 CT HRT W/O DYE W/CA TEST: CPT

## 2025-03-18 ENCOUNTER — PATIENT MESSAGE (OUTPATIENT)
Dept: PRIMARY CARE | Facility: CLINIC | Age: 60
End: 2025-03-18
Payer: COMMERCIAL

## 2025-03-18 DIAGNOSIS — R93.1 ELEVATED CORONARY ARTERY CALCIUM SCORE: Primary | ICD-10-CM

## 2025-03-21 DIAGNOSIS — I10 ESSENTIAL HYPERTENSION: ICD-10-CM

## 2025-03-21 PROCEDURE — RXMED WILLOW AMBULATORY MEDICATION CHARGE

## 2025-03-21 RX ORDER — POTASSIUM CHLORIDE 20 MEQ/1
20 TABLET, EXTENDED RELEASE ORAL DAILY
Qty: 90 TABLET | Refills: 3 | Status: SHIPPED | OUTPATIENT
Start: 2025-03-21 | End: 2026-03-21

## 2025-03-21 RX ORDER — HYDROCHLOROTHIAZIDE 25 MG/1
25 TABLET ORAL DAILY
Qty: 90 TABLET | Refills: 3 | Status: SHIPPED | OUTPATIENT
Start: 2025-03-21 | End: 2026-03-21

## 2025-03-21 RX ORDER — LISINOPRIL 40 MG/1
40 TABLET ORAL DAILY
Qty: 90 TABLET | Refills: 3 | Status: SHIPPED | OUTPATIENT
Start: 2025-03-21 | End: 2026-03-21

## 2025-03-25 ENCOUNTER — OFFICE VISIT (OUTPATIENT)
Dept: CARDIOLOGY | Facility: CLINIC | Age: 60
End: 2025-03-25
Payer: COMMERCIAL

## 2025-03-25 VITALS
WEIGHT: 247.44 LBS | SYSTOLIC BLOOD PRESSURE: 129 MMHG | HEART RATE: 80 BPM | HEIGHT: 72 IN | DIASTOLIC BLOOD PRESSURE: 79 MMHG | BODY MASS INDEX: 33.52 KG/M2 | OXYGEN SATURATION: 96 %

## 2025-03-25 DIAGNOSIS — E78.2 MIXED HYPERLIPIDEMIA: Primary | ICD-10-CM

## 2025-03-25 PROCEDURE — 3074F SYST BP LT 130 MM HG: CPT | Performed by: INTERNAL MEDICINE

## 2025-03-25 PROCEDURE — 1036F TOBACCO NON-USER: CPT | Performed by: INTERNAL MEDICINE

## 2025-03-25 PROCEDURE — 99214 OFFICE O/P EST MOD 30 MIN: CPT | Performed by: INTERNAL MEDICINE

## 2025-03-25 PROCEDURE — 3078F DIAST BP <80 MM HG: CPT | Performed by: INTERNAL MEDICINE

## 2025-03-25 PROCEDURE — 3008F BODY MASS INDEX DOCD: CPT | Performed by: INTERNAL MEDICINE

## 2025-03-25 PROCEDURE — 99244 OFF/OP CNSLTJ NEW/EST MOD 40: CPT | Performed by: INTERNAL MEDICINE

## 2025-03-25 ASSESSMENT — ENCOUNTER SYMPTOMS
OCCASIONAL FEELINGS OF UNSTEADINESS: 0
LOSS OF SENSATION IN FEET: 0
DEPRESSION: 0

## 2025-03-25 ASSESSMENT — COLUMBIA-SUICIDE SEVERITY RATING SCALE - C-SSRS
1. IN THE PAST MONTH, HAVE YOU WISHED YOU WERE DEAD OR WISHED YOU COULD GO TO SLEEP AND NOT WAKE UP?: NO
2. HAVE YOU ACTUALLY HAD ANY THOUGHTS OF KILLING YOURSELF?: NO
6. HAVE YOU EVER DONE ANYTHING, STARTED TO DO ANYTHING, OR PREPARED TO DO ANYTHING TO END YOUR LIFE?: NO

## 2025-03-25 ASSESSMENT — PAIN SCALES - GENERAL: PAINLEVEL_OUTOF10: 0-NO PAIN

## 2025-03-25 NOTE — PROGRESS NOTES
Primary Care Physician: Eitan Rodríguez MD  Date of Visit: 2025  2:20 PM EDT  [place] [facility] [place] [address]     Chief Complaint:   Chief Complaint   Patient presents with    Wound Check    Hyperlipidemia    Hypertension     Abnormal CT Calcium Score     Assessment for cardiac risk, recent labs reviewed   HPI / Summary  Eric Sancehz is a 59 y.o. male presents for new cardiovascular evaluation. No ref. 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  HTN  Lipid    Specialty Problems          Cardiology Problems    Essential hypertension    Hyperlipidemia        Past Medical History:   Diagnosis Date    Colon polyp     Hyperlipidemia     Hypertension     Insect bite (nonvenomous) of left upper arm, initial encounter 2024    Organic oligospermia 2011    Psoriasis           Past Surgical History:   Procedure Laterality Date    CHOLECYSTECTOMY      CIRCUMCISION, PRIMARY      COLONOSCOPY W/ BIOPSIES AND POLYPECTOMY            Social History:   reports that he has never smoked. He has never used smokeless tobacco. He reports that he does not currently use alcohol after a past usage of about 2.0 standard drinks of alcohol per week. He reports that he does not use drugs.      Allergies:  Allergies   Allergen Reactions    Cephalexin Hives       Outpatient Medications:  Current Outpatient Medications   Medication Instructions    aspirin 81 mg chewable tablet 1 tablet Orally Once a day    atorvastatin (LIPITOR) 20 mg, oral, Daily    cholecalciferol, vitamin D3, (VITAMIN D3 ORAL) 2000 UNIT-  as directed Orally Daily with food    docosahexaenoic acid/epa (FISH OIL ORAL) 1000  MG- 3 tablets Orally daily    hydroCHLOROthiazide (HYDRODiuril) 25 mg tablet TAKE 1 TABLET BY MOUTH ONE TIME DAILY    lisinopril 40 mg tablet TAKE 1 TABLET BY MOUTH ONE TIME DAILY    multivitamin tablet 1 tablet Orally daily    omeprazole (PRILOSEC) 40 mg, oral, 2 times daily before meals, Do not crush or chew.    potassium chloride CR (Klor-Con M20) 20 mEq ER tablet TAKE 1 TABLET BY MOUTH ONCE A DAY    sodium,potassium,mag sulfates (Suprep Bowel Prep Kit) 17.5-3.13-1.6 gram recon soln solution Drink one bottle by mouth twice as directed by the prep instructions       ROS     Physical Exam:  Vitals:    03/25/25 1403   Weight: 112 kg (247 lb 7 oz)   Height: 1.829 m (6')     Wt Readings from Last 5 Encounters:   03/25/25 112 kg (247 lb 7 oz)   10/04/24 107 kg (236 lb)   09/25/24 109 kg (239 lb 6.7 oz)   08/06/24 110 kg (242 lb 1.6 oz)   07/03/24 108 kg (237 lb)     Body mass index is 33.56 kg/m².   Physical Exam   Skin was warm and dry  Neck veins were flat.  Normal carotid upstrokes no bruits.  Regular rhythm no gallop no murmur.  Clear lungs without crackles.  Soft abdomen without masses.  No dependent edema with intact pedal pulses  Last Labs:  CMP:  Recent Labs     10/02/24  0708 06/27/24  1444 06/20/24  1501 06/16/24  1421 03/10/23  1140    135 137 128* 139   K 4.0 5.1 4.6 3.9 4.4    99 101 94* 99   CO2 29 26 22 22* 25   ANIONGAP 11 10 19 12 15   BUN 15 16 17 27* 15   CREATININE 0.87 0.80 0.72 0.90 0.8   EGFR >90 >90 >90 >90 104   GLUCOSE 114* 117* 117* 152* 119*     Recent Labs     10/02/24  0708 06/27/24  1444 06/20/24  1501 06/16/24  1421 03/10/23  1140   ALBUMIN 4.6 3.8 3.5 3.8 4.7   ALKPHOS 77 108 87 122 80   ALT 19 44* 56* 57* 25   AST 19 21 25 35 20   BILITOT 0.5 0.4 0.7 0.8 0.4   LIPASE  --   --   --  29  --      CBC:  Recent Labs     08/06/24  1108 06/27/24  1444 06/16/24  1421 03/10/23  1140 08/03/20  0913   WBC 7.6 10.9 9.1 7.0 6.0   HGB 14.4 12.6* 13.5 15.1 14.0   HCT 44.4 38.7* 39.1* 44.6  "40.8*    244 191 231 197   MCV 92 92 87 90.1 92.1     COAG: No results for input(s): \"INR\", \"DDIMERVTE\" in the last 62519 hours.  HEME/ENDO:  Recent Labs     08/06/24  1108 06/16/24  1421 03/10/23  1140 08/03/20  0913 03/03/20  1558   FERRITIN 165  --   --   --   --    IRONSAT 27  --   --   --   --    TSH  --  0.84 1.67 1.88 1.56   HGBA1C 5.7*  --  6.1*  --  6.7*      CARDIAC:   Recent Labs     08/03/20  0913        Recent Labs     10/02/24  0708 03/10/23  1140 03/03/20  1558   CHOL 163 279* 296*   HDL 39.3 51 42   TRIG 172* 202* 192*       Last Cardiology Tests:  ECG:      Echo:  Echo Results:  No results found for this or any previous visit from the past 3650 days.       Cath:      Stress Test:  Stress Results:  No results found for this or any previous visit from the past 365 days.         Cardiac Imaging:  CT cardiac scoring wo IV contrast  Narrative: Interpreted By:  Jaime Huntley,   STUDY:  CT CARDIAC SCORING WO IV CONTRAST;  3/10/2025 5:05 pm      INDICATION:  Signs/Symptoms:screening.      COMPARISON:  None.      ACCESSION NUMBER(S):  [id number]      ORDERING CLINICIAN:  YON KWON      TECHNIQUE:  Using prospective ECG gating, limited CT scan of the chest for  evaluation of coronary arteries was performed without intravenous  contrast. Coronary calcium scoring was performed according to the  method of Agatston.      FINDINGS:  The score and distribution of calcium in the coronary arteries is as  follows:      LM: 0.  LAD: 44.2.  LCx: 79.  RCA: 179.8.      Total: 303.      The visualized segments of the lungs are normally expanded.      The visualized mid/lower ascending thoracic aorta measures 3.9 cm in  diameter about the aortic root.      The heart is normal in size. Trace pericardial effusion is present.      No gross evidence of mediastinal or hilar lymphadenopathy is  identified.      Small hypodensity left hepatic lobe likely a cyst.      Impression: 1. Coronary artery calcium " "score of 303*.  2. Mild prominence ascending thoracic aorta up to 3.9 cm.  3. Additional findings as above.      *Coronary artery calcium scoring may be helpful in predicting the  risk for future coronary heart disease events.  According to the  American College of Cardiology Foundation Clinical Expert Consensus  Task Force, such testing provides important prognostic information in  patients with more than one coronary heart disease risk factor. The  coronary artery calcium score correlates with the annual risk of a  non-fatal myocardial infarction or coronary heart disease death.      Coronary artery score            Annual Risk      0-99                             0.4%  100-399                        1.3%  >400                            2.4%      These three \"breakpoints\" correspond to lower, intermediate and high  risk states for future coronary events.  Such information should be  used, along with appropriate clinical judgment, to make decisions  regarding the intensity of risk factor management strategies to treat  blood lipids and to modify other non-lipid coronary risk factors.      Reference: Eileen P et al. Circulation.  2007; 115:402-426      MACRO:  None      Signed by: Jaime Huntley 3/11/2025 11:22 AM  Dictation workstation:   [id number]        Assessment/Plan   57-year-old male with hypertension and dyslipidemia who has met secondary prevention goals on his current statin antihypertensive treatment largely doing well on lifestyle can do better with regard to diet and weight loss.  I suggested a stress test given that there was some calcification in the LAD territory.  I will make some further recommendations thereafter.  He should continue on 81 mg aspirin I wanted to thank you for the opportunity participating in his care        Orders:  No orders of the defined types were placed in this encounter.     Followup Appts:  Future Appointments   Date Time Provider Department Center   3/25/2025  2:20 " PM Riccardo Rasmussen MD [id number] [facility]   4/4/2025  2:00 PM Eitan Rodríguez MD [id number] [facility]           ____________________________________________________________  Riccardo Rasmussen MD    Senior Attending Physician  [facility]  [facility]

## 2025-03-27 ENCOUNTER — HOSPITAL ENCOUNTER (OUTPATIENT)
Dept: CARDIOLOGY | Facility: CLINIC | Age: 60
Discharge: HOME | End: 2025-03-27
Payer: COMMERCIAL

## 2025-03-27 DIAGNOSIS — E78.5 HYPERLIPIDEMIA, UNSPECIFIED: ICD-10-CM

## 2025-03-27 DIAGNOSIS — E78.2 MIXED HYPERLIPIDEMIA: ICD-10-CM

## 2025-03-27 PROCEDURE — 93016 CV STRESS TEST SUPVJ ONLY: CPT | Performed by: INTERNAL MEDICINE

## 2025-03-27 PROCEDURE — 93017 CV STRESS TEST TRACING ONLY: CPT

## 2025-03-27 PROCEDURE — 93018 CV STRESS TEST I&R ONLY: CPT | Performed by: INTERNAL MEDICINE

## 2025-03-28 ENCOUNTER — PHARMACY VISIT (OUTPATIENT)
Dept: PHARMACY | Facility: CLINIC | Age: 60
End: 2025-03-28
Payer: COMMERCIAL

## 2025-04-02 ASSESSMENT — ENCOUNTER SYMPTOMS
ABDOMINAL PAIN: 0
SHORTNESS OF BREATH: 0
FEVER: 0

## 2025-04-02 NOTE — PROGRESS NOTES
[facility] between [facility]: [facility]  PROGRESS NOTE      Eric Sanchez is a 59 y.o. male that is presenting today for Follow-up.    Assessment/Plan   Diagnoses and all orders for this visit:  Mixed hyperlipidemia  Essential hypertension  Elevated coronary artery calcium score  Hyperglycemia  Vitamin D deficiency  Encounter for prostate cancer screening  Annual physical exam  -     CBC and Auto Differential; Future  -     Comprehensive Metabolic Panel; Future  -     Lipid Panel; Future  -     Vitamin D 25-Hydroxy,Total (for eval of Vitamin D levels); Future  -     TSH with reflex to Free T4 if abnormal; Future  -     Prostate Specific Antigen; Future  IFG (impaired fasting glucose)  -     POCT glycosylated hemoglobin (Hb A1C) manually resulted  Other orders  -     Follow Up In Primary Care - Established  -     Follow Up In Primary Care - Health Maintenance; Future    Elevated coronary artery calcium score, continue the aspirin and statin therapy.    Hypertension, continue lisinopril and hydrochlorothiazide, seems to have been under reasonable control.    IFG.  We discussed the importance of weight loss efforts.    Reviewed screening and prevention schedule.    Subjective   HPI  59 y.o. male here for follow up.  Generally feels well, has no complaints.  Having hard time losing weight.  No chest pain, no shortness of breath.  Taking medications as outlined.    Review of Systems   Constitutional:  Negative for fever.   Respiratory:  Negative for shortness of breath.    Cardiovascular:  Negative for chest pain.   Gastrointestinal:  Negative for abdominal pain.   All other systems reviewed and are negative.     Objective   Vitals:    04/04/25 1402   BP: 138/86   Pulse: 57   Temp: 36.3 °C (97.3 °F)   SpO2: 95%      Body mass index is 33.91 kg/m².  Physical Exam  Vitals reviewed.   Constitutional:       Appearance: Normal appearance.   Cardiovascular:      Rate and Rhythm: Normal rate and regular rhythm.       "Heart sounds: No murmur heard.  Pulmonary:      Breath sounds: Normal breath sounds. No wheezing, rhonchi or rales.   Musculoskeletal:      Right lower leg: No edema.      Left lower leg: No edema.       Diagnostic Results   Lab Results   Component Value Date    GLUCOSE 114 (H) 10/02/2024    CALCIUM 9.7 10/02/2024     10/02/2024    K 4.0 10/02/2024    CO2 29 10/02/2024     10/02/2024    BUN 15 10/02/2024    CREATININE 0.87 10/02/2024     Lab Results   Component Value Date    ALT 19 10/02/2024    AST 19 10/02/2024    ALKPHOS 77 10/02/2024    BILITOT 0.5 10/02/2024     Lab Results   Component Value Date    WBC 7.6 08/06/2024    HGB 14.4 08/06/2024    HCT 44.4 08/06/2024    MCV 92 08/06/2024     08/06/2024     Lab Results   Component Value Date    CHOL 163 10/02/2024    CHOL 279 (H) 03/10/2023    CHOL 296 (H) 03/03/2020     Lab Results   Component Value Date    HDL 39.3 10/02/2024    HDL 51 03/10/2023    HDL 42 03/03/2020     Lab Results   Component Value Date    LDLCALC 89 10/02/2024    LDLCALC 188 (H) 03/10/2023    LDLCALC 216 (H) 03/03/2020     Lab Results   Component Value Date    TRIG 172 (H) 10/02/2024    TRIG 202 (H) 03/10/2023    TRIG 192 (H) 03/03/2020     No components found for: \"CHOLHDL\"  Lab Results   Component Value Date    HGBA1C 6.2 04/04/2025     Other labs not included in the list above were reviewed either before or during this encounter.    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    Psoriasis      Past Surgical History:   Procedure Laterality Date    CHOLECYSTECTOMY      CIRCUMCISION, PRIMARY      COLONOSCOPY W/ BIOPSIES AND POLYPECTOMY       Family History   Problem Relation Name Age of Onset    Breast cancer Mother Marjorie     Diabetes Father Chadwick 40 - 49        late 40s    Heart disease Father Chadwick 50 - " 59        late 50s     Social History     Socioeconomic History    Marital status:      Spouse name: Not on file    Number of children: Not on file    Years of education: Not on file    Highest education level: Not on file   Occupational History    Not on file   Tobacco Use    Smoking status: Never    Smokeless tobacco: Never   Vaping Use    Vaping status: Never Used   Substance and Sexual Activity    Alcohol use: Yes     Alcohol/week: 6.0 standard drinks of alcohol     Types: 6 Cans of beer per week    Drug use: Never    Sexual activity: Yes     Partners: Female   Other Topics Concern    Not on file   Social History Narrative    Not on file     Social Drivers of Health     Financial Resource Strain: Not on file   Food Insecurity: Not on file   Transportation Needs: Not on file   Physical Activity: Not on file   Stress: Not on file   Social Connections: Not on file   Intimate Partner Violence: Not on file   Housing Stability: Not on file     Allergies   Allergen Reactions    Cephalexin Hives     Current Outpatient Medications on File Prior to Visit   Medication Sig Dispense Refill    aspirin 81 mg chewable tablet 1 tablet Orally Once a day      atorvastatin (Lipitor) 20 mg tablet Take 1 tablet (20 mg) by mouth once daily. 90 tablet 3    cholecalciferol, vitamin D3, (VITAMIN D3 ORAL) 2000 UNIT-  as directed Orally Daily with food      docosahexaenoic acid/epa (FISH OIL ORAL) 1000 MG- 4 tablets Orally daily      hydroCHLOROthiazide (HYDRODiuril) 25 mg tablet TAKE 1 TABLET BY MOUTH ONE TIME DAILY 90 tablet 3    lisinopril 40 mg tablet TAKE 1 TABLET BY MOUTH ONE TIME DAILY 90 tablet 3    multivitamin tablet 1 tablet Orally daily      potassium chloride CR (Klor-Con M20) 20 mEq ER tablet TAKE 1 TABLET BY MOUTH ONCE A DAY 90 tablet 3    TURMERIC ORAL Take 1,000 mg by mouth once daily.      omeprazole (PriLOSEC) 40 mg DR capsule Take 1 capsule (40 mg) by mouth 2 times a day before meals for 14 days. Do not crush or  chew. 28 capsule 0    sodium,potassium,mag sulfates (Suprep Bowel Prep Kit) 17.5-3.13-1.6 gram recon soln solution Drink one bottle by mouth twice as directed by the prep instructions (Patient not taking: Reported on 4/4/2025) 354 mL 0     No current facility-administered medications on file prior to visit.     Immunization History   Administered Date(s) Administered    DTaP vaccine, pediatric  (INFANRIX) 10/10/2013    Flu vaccine, trivalent, preservative free, age 6 months and greater (Fluarix/Fluzone/Flulaval) 10/04/2024    Influenza, injectable, quadrivalent 10/09/2015    Moderna SARS-CoV-2 Vaccination 12/29/2020, 01/27/2021, 11/10/2021    Pfizer COVID-19 vaccine, 12 years and older, (30mcg/0.3mL) (Comirnaty) 10/06/2023, 10/08/2024    Pfizer COVID-19 vaccine, bivalent, age 12 years and older (30 mcg/0.3 mL) 11/01/2022    Tdap vaccine, age 7 year and older (BOOSTRIX, ADACEL) 03/13/2023    Zoster vaccine, recombinant, adult (SHINGRIX) 11/02/2023, 01/04/2024     Patient's medical history was reviewed and updated either before or during this encounter.       Eitan Rodríguez MD

## 2025-04-04 ENCOUNTER — OFFICE VISIT (OUTPATIENT)
Dept: PRIMARY CARE | Facility: CLINIC | Age: 60
End: 2025-04-04
Payer: COMMERCIAL

## 2025-04-04 VITALS
DIASTOLIC BLOOD PRESSURE: 86 MMHG | OXYGEN SATURATION: 95 % | BODY MASS INDEX: 33.86 KG/M2 | TEMPERATURE: 97.3 F | HEIGHT: 72 IN | SYSTOLIC BLOOD PRESSURE: 138 MMHG | WEIGHT: 250 LBS | HEART RATE: 57 BPM

## 2025-04-04 DIAGNOSIS — R93.1 ELEVATED CORONARY ARTERY CALCIUM SCORE: ICD-10-CM

## 2025-04-04 DIAGNOSIS — Z12.5 ENCOUNTER FOR PROSTATE CANCER SCREENING: ICD-10-CM

## 2025-04-04 DIAGNOSIS — R73.9 HYPERGLYCEMIA: ICD-10-CM

## 2025-04-04 DIAGNOSIS — I10 ESSENTIAL HYPERTENSION: ICD-10-CM

## 2025-04-04 DIAGNOSIS — E78.2 MIXED HYPERLIPIDEMIA: Primary | ICD-10-CM

## 2025-04-04 DIAGNOSIS — E55.9 VITAMIN D DEFICIENCY: ICD-10-CM

## 2025-04-04 DIAGNOSIS — Z00.00 ANNUAL PHYSICAL EXAM: ICD-10-CM

## 2025-04-04 DIAGNOSIS — R73.01 IFG (IMPAIRED FASTING GLUCOSE): ICD-10-CM

## 2025-04-04 LAB — POC HEMOGLOBIN A1C: 6.2 % (ref 4.2–6.5)

## 2025-04-04 PROCEDURE — 3075F SYST BP GE 130 - 139MM HG: CPT | Performed by: INTERNAL MEDICINE

## 2025-04-04 PROCEDURE — 3008F BODY MASS INDEX DOCD: CPT | Performed by: INTERNAL MEDICINE

## 2025-04-04 PROCEDURE — 1036F TOBACCO NON-USER: CPT | Performed by: INTERNAL MEDICINE

## 2025-04-04 PROCEDURE — 99214 OFFICE O/P EST MOD 30 MIN: CPT | Performed by: INTERNAL MEDICINE

## 2025-04-04 PROCEDURE — 83036 HEMOGLOBIN GLYCOSYLATED A1C: CPT | Performed by: INTERNAL MEDICINE

## 2025-04-04 PROCEDURE — 3079F DIAST BP 80-89 MM HG: CPT | Performed by: INTERNAL MEDICINE

## 2025-04-04 ASSESSMENT — LIFESTYLE VARIABLES
HOW OFTEN DURING THE LAST YEAR HAVE YOU NEEDED AN ALCOHOLIC DRINK FIRST THING IN THE MORNING TO GET YOURSELF GOING AFTER A NIGHT OF HEAVY DRINKING: NEVER
HAS A RELATIVE, FRIEND, DOCTOR, OR ANOTHER HEALTH PROFESSIONAL EXPRESSED CONCERN ABOUT YOUR DRINKING OR SUGGESTED YOU CUT DOWN: NO
HOW OFTEN DURING THE LAST YEAR HAVE YOU BEEN UNABLE TO REMEMBER WHAT HAPPENED THE NIGHT BEFORE BECAUSE YOU HAD BEEN DRINKING: NEVER
SKIP TO QUESTIONS 9-10: 0
HOW OFTEN DO YOU HAVE SIX OR MORE DRINKS ON ONE OCCASION: NEVER
AUDIT-C TOTAL SCORE: 4
HAVE YOU OR SOMEONE ELSE BEEN INJURED AS A RESULT OF YOUR DRINKING: NO
HOW OFTEN DURING THE LAST YEAR HAVE YOU FAILED TO DO WHAT WAS NORMALLY EXPECTED FROM YOU BECAUSE OF DRINKING: NEVER
HOW OFTEN DURING THE LAST YEAR HAVE YOU FOUND THAT YOU WERE NOT ABLE TO STOP DRINKING ONCE YOU HAD STARTED: NEVER
HOW MANY STANDARD DRINKS CONTAINING ALCOHOL DO YOU HAVE ON A TYPICAL DAY: 3 OR 4
HOW OFTEN DURING THE LAST YEAR HAVE YOU HAD A FEELING OF GUILT OR REMORSE AFTER DRINKING: NEVER
HOW OFTEN DO YOU HAVE A DRINK CONTAINING ALCOHOL: 2-3 TIMES A WEEK
AUDIT TOTAL SCORE: 4

## 2025-04-04 ASSESSMENT — PAIN SCALES - GENERAL: PAINLEVEL_OUTOF10: 0-NO PAIN

## 2025-04-04 ASSESSMENT — ENCOUNTER SYMPTOMS
OCCASIONAL FEELINGS OF UNSTEADINESS: 0
DEPRESSION: 0
LOSS OF SENSATION IN FEET: 0

## 2025-04-04 ASSESSMENT — PATIENT HEALTH QUESTIONNAIRE - PHQ9
SUM OF ALL RESPONSES TO PHQ9 QUESTIONS 1 AND 2: 0
2. FEELING DOWN, DEPRESSED OR HOPELESS: NOT AT ALL
1. LITTLE INTEREST OR PLEASURE IN DOING THINGS: NOT AT ALL

## 2025-04-28 PROCEDURE — RXMED WILLOW AMBULATORY MEDICATION CHARGE

## 2025-05-01 ENCOUNTER — PHARMACY VISIT (OUTPATIENT)
Dept: PHARMACY | Facility: CLINIC | Age: 60
End: 2025-05-01
Payer: COMMERCIAL

## 2025-05-17 ENCOUNTER — PATIENT MESSAGE (OUTPATIENT)
Dept: PRIMARY CARE | Facility: CLINIC | Age: 60
End: 2025-05-17
Payer: COMMERCIAL

## 2025-05-17 DIAGNOSIS — M79.672 LEFT FOOT PAIN: Primary | ICD-10-CM

## 2025-05-19 ENCOUNTER — HOSPITAL ENCOUNTER (OUTPATIENT)
Dept: RADIOLOGY | Facility: CLINIC | Age: 60
Discharge: HOME | End: 2025-05-19
Payer: COMMERCIAL

## 2025-05-19 DIAGNOSIS — M79.672 LEFT FOOT PAIN: ICD-10-CM

## 2025-05-19 PROCEDURE — 73630 X-RAY EXAM OF FOOT: CPT | Mod: LEFT SIDE | Performed by: RADIOLOGY

## 2025-05-19 PROCEDURE — 73630 X-RAY EXAM OF FOOT: CPT | Mod: LT

## 2025-06-17 PROCEDURE — 88305 TISSUE EXAM BY PATHOLOGIST: CPT | Performed by: DERMATOLOGY

## 2025-06-19 ENCOUNTER — LAB REQUISITION (OUTPATIENT)
Dept: DERMATOPATHOLOGY | Facility: CLINIC | Age: 60
End: 2025-06-19
Payer: COMMERCIAL

## 2025-06-19 DIAGNOSIS — D48.5 NEOPLASM OF UNCERTAIN BEHAVIOR OF SKIN: ICD-10-CM

## 2025-06-19 PROCEDURE — RXMED WILLOW AMBULATORY MEDICATION CHARGE

## 2025-06-27 ENCOUNTER — PHARMACY VISIT (OUTPATIENT)
Dept: PHARMACY | Facility: CLINIC | Age: 60
End: 2025-06-27
Payer: COMMERCIAL

## 2025-07-24 DIAGNOSIS — E78.2 MIXED HYPERLIPIDEMIA: ICD-10-CM

## 2025-07-24 PROCEDURE — RXMED WILLOW AMBULATORY MEDICATION CHARGE

## 2025-07-24 RX ORDER — ATORVASTATIN CALCIUM 20 MG/1
20 TABLET, FILM COATED ORAL DAILY
Qty: 90 TABLET | Refills: 3 | Status: SHIPPED | OUTPATIENT
Start: 2025-07-24 | End: 2026-07-24

## 2025-07-30 ENCOUNTER — PHARMACY VISIT (OUTPATIENT)
Dept: PHARMACY | Facility: CLINIC | Age: 60
End: 2025-07-30
Payer: COMMERCIAL